# Patient Record
Sex: FEMALE | Race: BLACK OR AFRICAN AMERICAN | NOT HISPANIC OR LATINO | Employment: FULL TIME | URBAN - METROPOLITAN AREA
[De-identification: names, ages, dates, MRNs, and addresses within clinical notes are randomized per-mention and may not be internally consistent; named-entity substitution may affect disease eponyms.]

---

## 2017-01-05 ENCOUNTER — OFFICE VISIT (OUTPATIENT)
Dept: ENDOCRINOLOGY | Age: 62
End: 2017-01-05

## 2017-01-05 VITALS
SYSTOLIC BLOOD PRESSURE: 126 MMHG | WEIGHT: 293 LBS | DIASTOLIC BLOOD PRESSURE: 78 MMHG | HEIGHT: 65 IN | BODY MASS INDEX: 48.82 KG/M2

## 2017-01-05 DIAGNOSIS — IMO0001 UNCONTROLLED TYPE 2 DIABETES MELLITUS WITHOUT COMPLICATION, WITH LONG-TERM CURRENT USE OF INSULIN: ICD-10-CM

## 2017-01-05 DIAGNOSIS — L65.9 HAIR LOSS: ICD-10-CM

## 2017-01-05 DIAGNOSIS — I10 BENIGN HYPERTENSION: ICD-10-CM

## 2017-01-05 DIAGNOSIS — IMO0001 UNCONTROLLED DIABETES MELLITUS TYPE 2 WITHOUT COMPLICATIONS, UNSPECIFIED LONG TERM INSULIN USE STATUS: Primary | ICD-10-CM

## 2017-01-05 DIAGNOSIS — E66.01 MORBID OBESITY DUE TO EXCESS CALORIES (HCC): ICD-10-CM

## 2017-01-05 DIAGNOSIS — E11.65 UNCONTROLLED TYPE 2 DIABETES MELLITUS WITH HYPERGLYCEMIA, UNSPECIFIED LONG TERM INSULIN USE STATUS: ICD-10-CM

## 2017-01-05 DIAGNOSIS — N28.9 RENAL INSUFFICIENCY: ICD-10-CM

## 2017-01-05 DIAGNOSIS — E78.5 DYSLIPIDEMIA: ICD-10-CM

## 2017-01-05 PROCEDURE — 99214 OFFICE O/P EST MOD 30 MIN: CPT | Performed by: NURSE PRACTITIONER

## 2017-01-05 RX ORDER — INSULIN ASPART 100 [IU]/ML
20 INJECTION, SOLUTION INTRAVENOUS; SUBCUTANEOUS
Qty: 30 ML | Refills: 3 | Status: SHIPPED | OUTPATIENT
Start: 2017-01-05 | End: 2017-01-09 | Stop reason: SDUPTHER

## 2017-01-05 RX ORDER — INSULIN DETEMIR 100 [IU]/ML
80 INJECTION, SOLUTION SUBCUTANEOUS 2 TIMES DAILY
Qty: 45 ML | Refills: 3 | Status: SHIPPED | OUTPATIENT
Start: 2017-01-05 | End: 2017-05-02 | Stop reason: SDUPTHER

## 2017-01-05 RX ORDER — LOSARTAN POTASSIUM 25 MG/1
25 TABLET ORAL DAILY
COMMUNITY
Start: 2016-10-23 | End: 2017-04-27 | Stop reason: SDUPTHER

## 2017-01-05 NOTE — PROGRESS NOTES
Subjective   Ria Chan is a 61 y.o. female is here today for follow-up.    Diabetes   She has type 2 diabetes mellitus. No MedicAlert identification noted. The initial diagnosis of diabetes was made 16 years ago. Pertinent negatives for hypoglycemia include no confusion, dizziness, headaches, hunger, mood changes, nervousness/anxiousness, pallor, seizures, sleepiness, speech difficulty, sweats or tremors. Associated symptoms include blurred vision, fatigue, polydipsia, visual change and weakness. Pertinent negatives for diabetes include no chest pain, no foot paresthesias, no foot ulcerations, no polyphagia and no weight loss. Pertinent negatives for hypoglycemia complications include no blackouts, no hospitalization, no required assistance and no required glucagon injection. Symptoms are worsening. Pertinent negatives for diabetic complications include no CVA, heart disease, impotence, nephropathy, peripheral neuropathy, PVD or retinopathy. Risk factors for coronary artery disease include dyslipidemia, family history, hypertension, obesity, post-menopausal and stress. Current diabetic treatment includes insulin injections. She is compliant with treatment most of the time. She is currently taking insulin pre-breakfast, pre-lunch and pre-dinner. Insulin injections are given by patient. Rotation sites for injection include the abdominal wall and arms. Her weight is fluctuating dramatically. She is following a generally healthy diet. Meal planning includes carbohydrate counting. She has not had a previous visit with a dietitian. She participates in exercise weekly. She monitors blood glucose at home 1-2 x per day. Blood glucose monitoring compliance is excellent. Her home blood glucose trend is increasing rapidly. Her breakfast blood glucose is taken between 5-6 am. Her breakfast blood glucose range is generally 180-200 mg/dl. Her dinner blood glucose is taken between 6-7 pm. Her dinner blood glucose range is  generally 180-200 mg/dl. Her highest blood glucose is 140-180 mg/dl. Her overall blood glucose range is 180-200 mg/dl. She does not see a podiatrist.Eye exam is current.     Encounter Diagnoses   Name Primary?   • Uncontrolled diabetes mellitus type 2 without complications, unspecified long term insulin use status Yes   • Dyslipidemia    • Benign hypertension    • Morbid obesity due to excess calories    • Renal insufficiency    • Uncontrolled type 2 diabetes mellitus with hyperglycemia, unspecified long term insulin use status    • Uncontrolled type 2 diabetes mellitus without complication, with long-term current use of insulin    • Hair loss        This is a 61-year-old female patient who is here today for a routine follow-up visit of the above-mentioned problems.  She is wanting to come off her medications because she is concerned about kidney damage.  She also refuses to take a GL P-1 test sgLT-2 because she is concerned of the side effects and states there are lots of side effects to some of the medications.  She was taken off her metformin due to signs of kidney distress.  She has been googling holistic approaches which she feels would be a better option for her.  I had a very long discussion with her regarding the lack of research and studies to support non-FDA approved therapies.  She has not had any recent labs done.  I reviewed her previous labs with her which reflects that her diabetes has never been under control since being on this office.  She states she is watching her diet however when questioned more closely she states she is probably not doing as much as she needs to do to help get some of her weight off.  Lots of stress at work. Working out with wt lifting and riding bike about 3 times a week. Has gained weight. States saxenda did not work to help lose weight. She is not very receptive to adding other medications to her current regimen however she is agreeable to increase her insulin.  She states  is too difficult for her to call during work hours to report her blood sugars and I have reminded the patient that she can email us through my chart.  Her lowest blood sugar was in the 60 range with symptoms several months ago.  She did bring her blood glucose meter which reflects that she is checking her blood sugars one to 3 times daily with all readings from the download be an upper 100-200+ range   The following portions of the patient's history were reviewed and updated as appropriate: allergies, current medications, past family history, past medical history, past social history, past surgical history and problem list.    Review of Systems   Constitutional: Positive for fatigue. Negative for weight loss.   HENT: Negative for ear pain.    Eyes: Positive for blurred vision. Negative for redness.   Respiratory: Negative for cough.    Cardiovascular: Negative for chest pain.   Gastrointestinal: Negative for blood in stool.   Endocrine: Positive for polydipsia. Negative for polyphagia.   Genitourinary: Negative for flank pain and impotence.   Musculoskeletal: Negative for myalgias.   Skin: Negative for pallor.   Allergic/Immunologic: Negative for food allergies.   Neurological: Positive for weakness. Negative for dizziness, tremors, seizures, speech difficulty and headaches.   Hematological: Negative for adenopathy.   Psychiatric/Behavioral: Negative for confusion. The patient is not nervous/anxious.        Objective   Physical Exam   Constitutional: She is oriented to person, place, and time. She appears well-developed and well-nourished. No distress.   HENT:   Head: Normocephalic and atraumatic.   Right Ear: External ear normal.   Left Ear: External ear normal.   Nose: Nose normal.   Mouth/Throat: Oropharynx is clear and moist. No oropharyngeal exudate.   Eyes: EOM are normal. Pupils are equal, round, and reactive to light. Right eye exhibits no discharge. Left eye exhibits no discharge.   Neck: Trachea normal,  normal range of motion and full passive range of motion without pain. Neck supple. No tracheal tenderness present. Carotid bruit is not present. No tracheal deviation, no edema and no erythema present. No thyroid mass and no thyromegaly present.   Cardiovascular: Normal rate, regular rhythm, normal heart sounds and intact distal pulses.  Exam reveals no gallop and no friction rub.    No murmur heard.  Pulmonary/Chest: Effort normal and breath sounds normal. No stridor. No respiratory distress. She has no wheezes. She has no rales.   Abdominal: Soft. Bowel sounds are normal. She exhibits no distension.   Musculoskeletal: Normal range of motion. She exhibits no edema or deformity.   Lymphadenopathy:     She has no cervical adenopathy.   Neurological: She is alert and oriented to person, place, and time.   Skin: Skin is warm and dry. No rash noted. She is not diaphoretic. No erythema. No pallor.   Acanthosis nigricans   Psychiatric: She has a normal mood and affect. Her behavior is normal. Judgment and thought content normal.   Nursing note and vitals reviewed.      Results for orders placed or performed in visit on 08/12/16   Comprehensive metabolic panel   Result Value Ref Range    Glucose 150 (H) 65 - 99 mg/dL    BUN 21 (H) 6 - 20 mg/dL    Creatinine 1.22 (H) 0.57 - 1.00 mg/dL    eGFR Non African Am 45 (L) >60 mL/min/1.73    eGFR African Am 55 (L) >60 mL/min/1.73    BUN/Creatinine Ratio 17.2 7.0 - 25.0    Sodium 142 136 - 145 mmol/L    Potassium 3.7 3.5 - 5.2 mmol/L    Chloride 100 98 - 107 mmol/L    Total CO2 26.0 22.0 - 29.0 mmol/L    Calcium 10.0 8.6 - 10.5 mg/dL    Total Protein 7.6 6.0 - 8.5 g/dL    Albumin 4.50 3.50 - 5.20 g/dL    Globulin 3.1 gm/dL    A/G Ratio 1.5 g/dL    Total Bilirubin 0.2 0.1 - 1.2 mg/dL    Alkaline Phosphatase 89 39 - 117 U/L    AST (SGOT) 19 1 - 32 U/L    ALT (SGPT) 21 1 - 33 U/L   C-peptide   Result Value Ref Range    C-Peptide 4.2 1.1 - 4.4 ng/mL   Lipid panel   Result Value Ref Range     Total Cholesterol 154 0 - 200 mg/dL    Triglycerides 69 0 - 150 mg/dL    HDL Cholesterol 69 (H) 40 - 60 mg/dL    VLDL Cholesterol 13.8 5 - 40 mg/dL    LDL Cholesterol  71 0 - 100 mg/dL   Hemoglobin A1c   Result Value Ref Range    Hemoglobin A1C 7.78 (H) 4.80 - 5.60 %   T4 and TSH ([facility] Only)   Result Value Ref Range    TSH 2.020 0.270 - 4.200 mIU/mL    T4, Total 6.38 4.50 - 11.70 mcg/dL   Uric acid   Result Value Ref Range    Uric Acid 6.9 (H) 2.4 - 5.7 mg/dL   Vitamin D 25 hydroxy   Result Value Ref Range    25 Hydroxy, Vitamin D 42.9 30.0 - 100.0 ng/mL   MicroAlbumin, urine, random   Result Value Ref Range    Microalbumin, Urine 206.8 Not Estab. ug/mL       Assessment/Plan   Ria was seen today for diabetes and hypertension.    Diagnoses and all orders for this visit:    Uncontrolled diabetes mellitus type 2 without complications, unspecified long term insulin use status    Dyslipidemia    Benign hypertension    Morbid obesity due to excess calories    In summary, patient was seen and examined.  I spent an extensive amount of time with her discussing the pathophysiology of type 2 diabetes and the methods of treatment.  She was also counseled on the actions of medications.  Her last labs reflected that she had some renal insufficiency however prior to that her kidney function was in the normal range.  She is more concerned with taking medications that she is about the risk of complications with type 2 diabetes.  She was reminded at today's visit that she can email us through my chart to let us know what her blood sugars are doing so that we can make further adjustments.  She was also reminded on treatment of hypoglycemic reactions and warned that with the increase of insulin we are also increasing her risk of hypoglycemia.  She has been instructed to monitor her blood sugars closely.  She will follow-up with Dr. Carrillo her next appointment with labs prior.  She will have extensive labs done today and she  will be notified of the results along with further recommendations if needed.    Increase levemir to 80 units twice daily  Increase novolog 20 units each meal  If blood sugars do not come down please contact office.   Treat any bs less than 70 with 4 glucose tabs.

## 2017-01-05 NOTE — MR AVS SNAPSHOT
Ria Chan   1/5/2017 3:00 PM   Office Visit    Dept Phone:  [phone]   Encounter #:  [id number]    Provider:  RASHAWN Harrington   Department:  [facility] ENDOCRINOLOGY                Your Full Care Plan              Today's Medication Changes          These changes are accurate as of: 1/5/17  4:14 PM.  If you have any questions, ask your nurse or doctor.               Medication(s)that have changed:     LEVEMIR FLEXPEN 100 UNIT/ML injection   Generic drug:  insulin detemir   Inject 80 Units under the skin 2 (Two) Times a Day.   What changed:  how much to take   Changed by:  RASHAWN Harrington       losartan 25 MG tablet   Commonly known as:  COZAAR   Take 25 mg by mouth Daily.   What changed:  Another medication with the same name was removed. Continue taking this medication, and follow the directions you see here.   Changed by:  RASHAWN Harrington       NOVOLOG FLEXPEN 100 UNIT/ML solution pen-injector sc pen   Generic drug:  insulin aspart   Inject 20 Units under the skin 3 (Three) Times a Day With Meals. 10 units ac bf, 5 ac lunch, and 35 ac dinner   What changed:  how much to take   Changed by:  RASHAWN Harrington       rosuvastatin 20 MG tablet   Commonly known as:  CRESTOR   Take 20 mg by mouth Daily.   What changed:  Another medication with the same name was removed. Continue taking this medication, and follow the directions you see here.   Changed by:  RASHAWN Harrington         Stop taking medication(s)listed here:     SAXENDA 18 MG/3ML solution pen-injector   Generic drug:  Liraglutide -Weight Management   Stopped by:  RASHAWN Harrington                Where to Get Your Medications      These medications were sent to [facility] - [place], [place] - [address] [place] [address] - [phone]  - [phone] [address] [address]     Phone:  [phone]     LEVEMIR FLEXPEN 100 UNIT/ML injection    NOVOLOG FLEXPEN  100 UNIT/ML solution pen-injector sc pen                  Your Updated Medication List          This list is accurate as of: 1/5/17  4:14 PM.  Always use your most recent med list.                allopurinol 100 MG tablet   Commonly known as:  ZYLOPRIM   Take 1 tablet by mouth daily.       amLODIPine 10 MG tablet   Commonly known as:  NORVASC   Take 1 tablet by mouth Daily.       aspirin 81 MG tablet       BD PEN NEEDLE SERVANDO U/F 32G X 4 MM misc   Generic drug:  Insulin Pen Needle       furosemide 40 MG tablet   Commonly known as:  LASIX       KLOR-CON M15 PO       LEVEMIR FLEXPEN 100 UNIT/ML injection   Generic drug:  insulin detemir   Inject 80 Units under the skin 2 (Two) Times a Day.       losartan 25 MG tablet   Commonly known as:  COZAAR       MULTIVITAMIN ADULT PO       NOVOLOG FLEXPEN 100 UNIT/ML solution pen-injector sc pen   Generic drug:  insulin aspart   Inject 20 Units under the skin 3 (Three) Times a Day With Meals. 10 units ac bf, 5 ac lunch, and 35 ac dinner       ONE TOUCH ULTRA TEST test strip   Generic drug:  glucose blood       rosuvastatin 20 MG tablet   Commonly known as:  CRESTOR               We Performed the Following     C-Peptide     Comprehensive Metabolic Panel     Hemoglobin A1c     Lipid Panel     MicroAlbumin, Urine, Random     T3, Free     T4, Free     TestT+TestF+SHBG     Thyroid Panel With TSH     Uric Acid     Vitamin D 25 Hydroxy       You Were Diagnosed With        Codes Comments    Uncontrolled diabetes mellitus type 2 without complications, unspecified long term insulin use status    -  Primary ICD-10-CM: E11.65  ICD-9-CM: 250.02     Dyslipidemia     ICD-10-CM: E78.5  ICD-9-CM: 272.4     Benign hypertension     ICD-10-CM: I10  ICD-9-CM: 401.1     Morbid obesity due to excess calories     ICD-10-CM: E66.01  ICD-9-CM: 278.01     Renal insufficiency     ICD-10-CM: N28.9  ICD-9-CM: 593.9     Uncontrolled type 2 diabetes mellitus with hyperglycemia, unspecified long term insulin use  status     ICD-10-CM: E11.65  ICD-9-CM: 250.02     Uncontrolled type 2 diabetes mellitus without complication, with long-term current use of insulin     ICD-10-CM: E11.65, Z79.4  ICD-9-CM: 250.02, V58.67     Hair loss     ICD-10-CM: L65.9  ICD-9-CM: 704.00       Instructions    Increase levemir to 80 units twice daily  Increase novolog 20 units each meal  If blood sugars do not come down please contact office.   Treat any bs less than 70 with 4 glucose tabs.      Patient Instructions History      Upcoming Appointments     Visit Type Date Time Department    OFFICE VISIT 1/5/2017  3:00 PM [facility]    OFFICE VISIT 1/6/2017 10:00 AM [facility]    [facility] 4/28/2017  4:00 PM [facility]    OFFICE VISIT 5/12/2017  3:00 PM [facility]      MyChart Signup     Our records indicate that you have an active [employer] account.    You can view your After Visit Summary by going to [employer] and logging in with your [employer] username and password.  If you don't have a [employer] username and password but a parent or guardian has access to your record, the parent or guardian should login with their own [employer] username and password and access your record to view the After Visit Summary.    If you have questions, you can email [email or web address] or call [phone] to talk to our [employer] staff.  Remember, [employer] is NOT to be used for urgent needs.  For medical emergencies, dial 911.               Other Info from Your Visit           Your Appointments     Jan 06, 2017 10:00 AM EST   Office Visit with Talisha Weems MD   [facility] FAMILY MEDICINE (--)    [address] [facility]  [facility] [address] [phone]           Arrive 15 minutes prior to appointment.            Apr 28, 2017  4:00 PM EDT   [facility], [facility]   [facility] ENDOCR[facility]OLOGY (--)    [address] [place] [address]  "[id number] [phone]            May 12, 2017  3:00 PM EDT   Office Visit with Jesus Carrillo MD   [facility] ENDOCRINOLOGY (--)    [address] [address] [phone]           Arrive 15 minutes prior to appointment.              Allergies     Aliskiren Allergy Other (See Comments)    Causes patient to go into a coma    Ace Inhibitors Allergy Angioedema    Diclofenac Allergy Angioedema    Sulfamethoxazole-trimethoprim Allergy Nausea Only      Reason for Visit     Diabetes no recent labs, brought BG meter, testing BG 2 times daily, patient is asking for a holistic approach to treating diabetes    Hypertension patient is not taking Saxenda or allopurinol      Vital Signs     Blood Pressure Height Weight Body Mass Index Smoking Status       126/78 65\" (165.1 cm) 304 lb 6.4 oz (138 kg) 50.65 kg/m2 Never Smoker       Problems and Diagnoses Noted     High blood pressure    Dyslipidemia    Hair loss    Severe obesity    Poor kidney function    Type II diabetes mellitus without control        "

## 2017-01-05 NOTE — PATIENT INSTRUCTIONS
Increase levemir to 80 units twice daily  Increase novolog 20 units each meal  If blood sugars do not come down please contact office.   Treat any bs less than 70 with 4 glucose tabs.

## 2017-01-06 ENCOUNTER — OFFICE VISIT (OUTPATIENT)
Dept: FAMILY MEDICINE CLINIC | Facility: CLINIC | Age: 62
End: 2017-01-06

## 2017-01-06 VITALS
RESPIRATION RATE: 17 BRPM | HEIGHT: 65 IN | WEIGHT: 293 LBS | SYSTOLIC BLOOD PRESSURE: 142 MMHG | BODY MASS INDEX: 48.82 KG/M2 | HEART RATE: 79 BPM | DIASTOLIC BLOOD PRESSURE: 80 MMHG | OXYGEN SATURATION: 98 %

## 2017-01-06 DIAGNOSIS — I10 ESSENTIAL HYPERTENSION: Primary | ICD-10-CM

## 2017-01-06 DIAGNOSIS — Z79.899 DRUG THERAPY: ICD-10-CM

## 2017-01-06 DIAGNOSIS — IMO0001 UNCONTROLLED TYPE 2 DIABETES MELLITUS WITHOUT COMPLICATION, WITH LONG-TERM CURRENT USE OF INSULIN: ICD-10-CM

## 2017-01-06 DIAGNOSIS — E66.01 MORBID OBESITY DUE TO EXCESS CALORIES (HCC): ICD-10-CM

## 2017-01-06 DIAGNOSIS — Z00.00 HEALTH CARE MAINTENANCE: ICD-10-CM

## 2017-01-06 PROCEDURE — 99214 OFFICE O/P EST MOD 30 MIN: CPT | Performed by: FAMILY MEDICINE

## 2017-01-06 RX ORDER — FUROSEMIDE 40 MG/1
40 TABLET ORAL 2 TIMES DAILY
Qty: 90 TABLET | Refills: 3 | Status: SHIPPED | OUTPATIENT
Start: 2017-01-06 | End: 2017-04-27 | Stop reason: SDUPTHER

## 2017-01-06 RX ORDER — FUROSEMIDE 40 MG/1
40 TABLET ORAL 2 TIMES DAILY
Qty: 90 TABLET | Refills: 3 | Status: SHIPPED | OUTPATIENT
Start: 2017-01-06 | End: 2017-01-06 | Stop reason: SDUPTHER

## 2017-01-06 RX ORDER — ROSUVASTATIN CALCIUM 20 MG/1
20 TABLET, COATED ORAL DAILY
Qty: 90 TABLET | Refills: 3 | Status: SHIPPED | OUTPATIENT
Start: 2017-01-06 | End: 2017-07-27 | Stop reason: SDUPTHER

## 2017-01-06 RX ORDER — AMLODIPINE BESYLATE 10 MG/1
10 TABLET ORAL DAILY
Qty: 90 TABLET | Refills: 3 | Status: SHIPPED | OUTPATIENT
Start: 2017-01-06 | End: 2017-07-27 | Stop reason: SDUPTHER

## 2017-01-06 NOTE — MR AVS SNAPSHOT
Ria Chan   1/6/2017 10:00 AM   Office Visit    Dept Phone:  [phone]   Encounter #:  [id number]    Provider:  Talisha Weems MD   Department:  [facility] FAMILY MEDICINE                Your Full Care Plan              Today's Medication Changes          These changes are accurate as of: 1/6/17 10:45 AM.  If you have any questions, ask your nurse or doctor.               Stop taking medication(s)listed here:     allopurinol 100 MG tablet   Commonly known as:  ZYLOPRIM                Where to Get Your Medications      These medications were sent to [facility] - [place], [place] - [address] [place] [address] - [phone]  - [phone]   [address] [place] [address]     Phone:  [phone]     amLODIPine 10 MG tablet    furosemide 40 MG tablet    rosuvastatin 20 MG tablet                  Your Updated Medication List          This list is accurate as of: 1/6/17 10:45 AM.  Always use your most recent med list.                amLODIPine 10 MG tablet   Commonly known as:  NORVASC   Take 1 tablet by mouth Daily.       aspirin 81 MG tablet       BD PEN NEEDLE SERVANDO U/F 32G X 4 MM misc   Generic drug:  Insulin Pen Needle       furosemide 40 MG tablet   Commonly known as:  LASIX   Take 1 tablet by mouth 2 (Two) Times a Day.       KLOR-CON M15 PO       LEVEMIR FLEXPEN 100 UNIT/ML injection   Generic drug:  insulin detemir   Inject 80 Units under the skin 2 (Two) Times a Day.       losartan 25 MG tablet   Commonly known as:  COZAAR       MULTIVITAMIN ADULT PO       NOVOLOG FLEXPEN 100 UNIT/ML solution pen-injector sc pen   Generic drug:  insulin aspart   Inject 20 Units under the skin 3 (Three) Times a Day With Meals. 10 units ac bf, 5 ac lunch, and 35 ac dinner       ONE TOUCH ULTRA TEST test strip   Generic drug:  glucose blood       rosuvastatin 20 MG tablet   Commonly known as:  CRESTOR   Take 1 tablet by mouth Daily.               We  Performed the Following     CBC & Differential     Hepatitis C Antibody       You Were Diagnosed With        Codes Comments    Drug therapy    -  Primary ICD-10-CM: Z79.899  ICD-9-CM: V58.69     Health care maintenance     ICD-10-CM: Z00.00  ICD-9-CM: V70.0     Morbid obesity due to excess calories     ICD-10-CM: E66.01  ICD-9-CM: 278.01     Uncontrolled type 2 diabetes mellitus without complication, with long-term current use of insulin     ICD-10-CM: E11.65, Z79.4  ICD-9-CM: 250.02, V58.67     Essential hypertension     ICD-10-CM: I10  ICD-9-CM: 401.9       Instructions     None    Patient Instructions History      Upcoming Appointments     Visit Type Date Time Department    OFFICE VISIT 1/6/2017 10:00 AM [facility]    [facility] 4/28/2017  4:00 PM [facility]    OFFICE VISIT 5/12/2017  3:00 PM [facility]      [employer] Signup     Our records indicate that you have an active [employer] account.    You can view your After Visit Summary by going to [employer] and logging in with your [employer] username and password.  If you don't have a [employer] username and password but a parent or guardian has access to your record, the parent or guardian should login with their own [employer] username and password and access your record to view the After Visit Summary.    If you have questions, you can email [email or web address] or call [phone] to talk to our [employer] staff.  Remember, [employer] is NOT to be used for urgent needs.  For medical emergencies, dial 911.               Other Info from Your Visit           Your Appointments     Apr 28, 2017  4:00 PM EDT   [facility]   [facility] ENDOCRINOLOGY (--)    [address] [place] [address]   [phone]            May 12, 2017  3:00 PM EDT   Office Visit with Jesus Carrillo MD   [facility] ENDOCRINOLOGY (--)    [address]  "[address] [place] [address] [phone]           Arrive 15 minutes prior to appointment.              Allergies     Aliskiren Allergy Other (See Comments)    Causes patient to go into a coma    Ace Inhibitors Allergy Angioedema    Diclofenac Allergy Angioedema    Sulfamethoxazole-trimethoprim Allergy Nausea Only      Reason for Visit     Diabetes           Vital Signs     Blood Pressure Pulse Respirations Height Weight Oxygen Saturation    142/80 79 17 65\" (165.1 cm) 302 lb (137 kg) 98%    Body Mass Index Smoking Status                50.26 kg/m2 Never Smoker          Problems and Diagnoses Noted     Severe obesity    Type II diabetes mellitus without control    Pharmacologic therapy    -  Primary    Health maintenance examination        High blood pressure            "

## 2017-01-07 LAB
25(OH)D3+25(OH)D2 SERPL-MCNC: 32.9 NG/ML (ref 30–100)
ALBUMIN SERPL-MCNC: 4.5 G/DL (ref 3.5–5.2)
ALBUMIN/GLOB SERPL: 1.7 G/DL
ALP SERPL-CCNC: 122 U/L (ref 39–117)
ALT SERPL-CCNC: 25 U/L (ref 1–33)
AST SERPL-CCNC: 23 U/L (ref 1–32)
BASOPHILS # BLD AUTO: 0.02 10*3/MM3 (ref 0–0.2)
BASOPHILS NFR BLD AUTO: 0.3 % (ref 0–1.5)
BILIRUB SERPL-MCNC: 0.3 MG/DL (ref 0.1–1.2)
BUN SERPL-MCNC: 18 MG/DL (ref 8–23)
BUN/CREAT SERPL: 15.9 (ref 7–25)
C PEPTIDE SERPL-MCNC: 4.2 NG/ML (ref 1.1–4.4)
CALCIUM SERPL-MCNC: 9.6 MG/DL (ref 8.6–10.5)
CHLORIDE SERPL-SCNC: 101 MMOL/L (ref 98–107)
CHOLEST SERPL-MCNC: 145 MG/DL (ref 0–200)
CO2 SERPL-SCNC: 27.3 MMOL/L (ref 22–29)
CREAT SERPL-MCNC: 1.13 MG/DL (ref 0.57–1)
EOSINOPHIL # BLD AUTO: 0.2 10*3/MM3 (ref 0–0.7)
EOSINOPHIL NFR BLD AUTO: 2.8 % (ref 0.3–6.2)
ERYTHROCYTE [DISTWIDTH] IN BLOOD BY AUTOMATED COUNT: 16.3 % (ref 11.7–13)
FT4I SERPL CALC-MCNC: 1.7 (ref 1.2–4.9)
GLOBULIN SER CALC-MCNC: 2.6 GM/DL
GLUCOSE SERPL-MCNC: 266 MG/DL (ref 65–99)
HBA1C MFR BLD: 10.15 % (ref 4.8–5.6)
HCT VFR BLD AUTO: 39 % (ref 35.6–45.5)
HCV AB S/CO SERPL IA: 0.1 S/CO RATIO (ref 0–0.9)
HDLC SERPL-MCNC: 61 MG/DL (ref 40–60)
HGB BLD-MCNC: 12.3 G/DL (ref 11.9–15.5)
IMM GRANULOCYTES # BLD: 0 10*3/MM3 (ref 0–0.03)
IMM GRANULOCYTES NFR BLD: 0 % (ref 0–0.5)
LDLC SERPL CALC-MCNC: 68 MG/DL (ref 0–100)
LYMPHOCYTES # BLD AUTO: 1.67 10*3/MM3 (ref 0.9–4.8)
LYMPHOCYTES NFR BLD AUTO: 23.7 % (ref 19.6–45.3)
MCH RBC QN AUTO: 24.8 PG (ref 26.9–32)
MCHC RBC AUTO-ENTMCNC: 31.5 G/DL (ref 32.4–36.3)
MCV RBC AUTO: 78.8 FL (ref 80.5–98.2)
MICROALBUMIN UR-MCNC: 467.1 UG/ML
MONOCYTES # BLD AUTO: 0.57 10*3/MM3 (ref 0.2–1.2)
MONOCYTES NFR BLD AUTO: 8.1 % (ref 5–12)
NEUTROPHILS # BLD AUTO: 4.6 10*3/MM3 (ref 1.9–8.1)
NEUTROPHILS NFR BLD AUTO: 65.1 % (ref 42.7–76)
NRBC BLD AUTO-RTO: 0 /100 WBC (ref 0–0)
PLATELET # BLD AUTO: 336 10*3/MM3 (ref 140–500)
POTASSIUM SERPL-SCNC: 4.2 MMOL/L (ref 3.5–5.2)
PROT SERPL-MCNC: 7.1 G/DL (ref 6–8.5)
RBC # BLD AUTO: 4.95 10*6/MM3 (ref 3.9–5.2)
SHBG SERPL-SCNC: 22.6 NMOL/L (ref 17.3–125)
SODIUM SERPL-SCNC: 143 MMOL/L (ref 136–145)
T3FREE SERPL-MCNC: 2.7 PG/ML (ref 2–4.4)
T3RU NFR SERPL: 28 % (ref 24–39)
T4 FREE SERPL-MCNC: 0.9 NG/DL (ref 0.93–1.7)
T4 SERPL-MCNC: 6 UG/DL (ref 4.5–12)
TESTOST FREE SERPL-MCNC: 1 PG/ML (ref 0–4.2)
TESTOST SERPL-MCNC: 15 NG/DL (ref 3–41)
TRIGL SERPL-MCNC: 82 MG/DL (ref 0–150)
TSH SERPL DL<=0.005 MIU/L-ACNC: 2.06 UIU/ML (ref 0.45–4.5)
URATE SERPL-MCNC: 6.3 MG/DL (ref 2.4–5.7)
VLDLC SERPL CALC-MCNC: 16.4 MG/DL (ref 5–40)
WBC # BLD AUTO: 7.06 10*3/MM3 (ref 4.5–10.7)

## 2017-01-09 DIAGNOSIS — IMO0001 UNCONTROLLED TYPE 2 DIABETES MELLITUS WITHOUT COMPLICATION, WITH LONG-TERM CURRENT USE OF INSULIN: ICD-10-CM

## 2017-01-09 DIAGNOSIS — IMO0001 UNCONTROLLED DIABETES MELLITUS TYPE 2 WITHOUT COMPLICATIONS, UNSPECIFIED LONG TERM INSULIN USE STATUS: ICD-10-CM

## 2017-01-09 DIAGNOSIS — N28.9 RENAL INSUFFICIENCY: ICD-10-CM

## 2017-01-09 DIAGNOSIS — E78.5 DYSLIPIDEMIA: ICD-10-CM

## 2017-01-09 DIAGNOSIS — I10 BENIGN HYPERTENSION: ICD-10-CM

## 2017-01-09 DIAGNOSIS — E11.65 UNCONTROLLED TYPE 2 DIABETES MELLITUS WITH HYPERGLYCEMIA, UNSPECIFIED LONG TERM INSULIN USE STATUS: ICD-10-CM

## 2017-01-09 DIAGNOSIS — E66.01 MORBID OBESITY DUE TO EXCESS CALORIES (HCC): ICD-10-CM

## 2017-01-09 RX ORDER — INSULIN ASPART 100 [IU]/ML
INJECTION, SOLUTION INTRAVENOUS; SUBCUTANEOUS
Qty: 30 ML | Refills: 3 | Status: SHIPPED | OUTPATIENT
Start: 2017-01-09 | End: 2017-05-02 | Stop reason: SDUPTHER

## 2017-01-11 DIAGNOSIS — IMO0001 UNCONTROLLED DIABETES MELLITUS TYPE 2 WITHOUT COMPLICATIONS, UNSPECIFIED LONG TERM INSULIN USE STATUS: ICD-10-CM

## 2017-01-11 DIAGNOSIS — E66.01 MORBID OBESITY DUE TO EXCESS CALORIES (HCC): ICD-10-CM

## 2017-01-11 DIAGNOSIS — N28.9 RENAL INSUFFICIENCY: ICD-10-CM

## 2017-01-11 DIAGNOSIS — E11.65 UNCONTROLLED TYPE 2 DIABETES MELLITUS WITH HYPERGLYCEMIA, UNSPECIFIED LONG TERM INSULIN USE STATUS: ICD-10-CM

## 2017-01-11 DIAGNOSIS — I10 BENIGN HYPERTENSION: ICD-10-CM

## 2017-01-11 DIAGNOSIS — IMO0001 UNCONTROLLED TYPE 2 DIABETES MELLITUS WITHOUT COMPLICATION, WITH LONG-TERM CURRENT USE OF INSULIN: ICD-10-CM

## 2017-01-11 DIAGNOSIS — E78.5 DYSLIPIDEMIA: ICD-10-CM

## 2017-02-28 DIAGNOSIS — E66.01 MORBID OBESITY DUE TO EXCESS CALORIES (HCC): ICD-10-CM

## 2017-02-28 DIAGNOSIS — E11.00 UNCONTROLLED TYPE 2 DIABETES MELLITUS WITH HYPEROSMOLARITY WITHOUT COMA, WITHOUT LONG-TERM CURRENT USE OF INSULIN (HCC): ICD-10-CM

## 2017-02-28 RX ORDER — POTASSIUM CHLORIDE 1125 MG/1
15 TABLET, EXTENDED RELEASE ORAL 2 TIMES DAILY
Qty: 180 TABLET | Refills: 1 | Status: SHIPPED | OUTPATIENT
Start: 2017-02-28 | End: 2017-06-15

## 2017-04-21 DIAGNOSIS — E55.9 VITAMIN D DEFICIENCY: Primary | ICD-10-CM

## 2017-04-21 DIAGNOSIS — E78.5 DYSLIPIDEMIA: ICD-10-CM

## 2017-04-21 DIAGNOSIS — IMO0001 UNCONTROLLED DIABETES MELLITUS TYPE 2 WITHOUT COMPLICATIONS, UNSPECIFIED LONG TERM INSULIN USE STATUS: Primary | ICD-10-CM

## 2017-04-27 DIAGNOSIS — IMO0001 UNCONTROLLED TYPE 2 DIABETES MELLITUS WITHOUT COMPLICATION, WITH LONG-TERM CURRENT USE OF INSULIN: ICD-10-CM

## 2017-04-27 DIAGNOSIS — I10 ESSENTIAL HYPERTENSION: ICD-10-CM

## 2017-04-27 DIAGNOSIS — E66.01 MORBID OBESITY DUE TO EXCESS CALORIES (HCC): ICD-10-CM

## 2017-04-27 DIAGNOSIS — N28.9 RENAL INSUFFICIENCY: ICD-10-CM

## 2017-04-27 DIAGNOSIS — E11.65 UNCONTROLLED TYPE 2 DIABETES MELLITUS WITH HYPERGLYCEMIA, UNSPECIFIED LONG TERM INSULIN USE STATUS: ICD-10-CM

## 2017-04-27 DIAGNOSIS — IMO0001 UNCONTROLLED DIABETES MELLITUS TYPE 2 WITHOUT COMPLICATIONS, UNSPECIFIED LONG TERM INSULIN USE STATUS: ICD-10-CM

## 2017-04-27 DIAGNOSIS — E78.5 DYSLIPIDEMIA: ICD-10-CM

## 2017-04-27 DIAGNOSIS — I10 BENIGN HYPERTENSION: ICD-10-CM

## 2017-04-27 RX ORDER — LOSARTAN POTASSIUM 25 MG/1
25 TABLET ORAL DAILY
Qty: 90 TABLET | Refills: 4 | Status: SHIPPED | OUTPATIENT
Start: 2017-04-27 | End: 2018-12-10

## 2017-04-27 RX ORDER — FUROSEMIDE 40 MG/1
40 TABLET ORAL 2 TIMES DAILY
Qty: 180 TABLET | Refills: 3 | Status: SHIPPED | OUTPATIENT
Start: 2017-04-27 | End: 2017-06-15

## 2017-05-02 DIAGNOSIS — IMO0001 UNCONTROLLED DIABETES MELLITUS TYPE 2 WITHOUT COMPLICATIONS, UNSPECIFIED LONG TERM INSULIN USE STATUS: ICD-10-CM

## 2017-05-02 DIAGNOSIS — N28.9 RENAL INSUFFICIENCY: ICD-10-CM

## 2017-05-02 DIAGNOSIS — E66.01 MORBID OBESITY DUE TO EXCESS CALORIES (HCC): ICD-10-CM

## 2017-05-02 DIAGNOSIS — E78.5 DYSLIPIDEMIA: ICD-10-CM

## 2017-05-02 DIAGNOSIS — I10 BENIGN HYPERTENSION: ICD-10-CM

## 2017-05-02 DIAGNOSIS — E11.65 UNCONTROLLED TYPE 2 DIABETES MELLITUS WITH HYPERGLYCEMIA, UNSPECIFIED LONG TERM INSULIN USE STATUS: ICD-10-CM

## 2017-05-02 DIAGNOSIS — IMO0001 UNCONTROLLED TYPE 2 DIABETES MELLITUS WITHOUT COMPLICATION, WITH LONG-TERM CURRENT USE OF INSULIN: ICD-10-CM

## 2017-05-02 RX ORDER — INSULIN ASPART 100 [IU]/ML
INJECTION, SOLUTION INTRAVENOUS; SUBCUTANEOUS
Qty: 5 PEN | Refills: 1 | Status: SHIPPED | OUTPATIENT
Start: 2017-05-02 | End: 2017-05-05 | Stop reason: SDUPTHER

## 2017-05-02 RX ORDER — INSULIN DETEMIR 100 [IU]/ML
80 INJECTION, SOLUTION SUBCUTANEOUS 2 TIMES DAILY
Qty: 16 PEN | Refills: 1 | Status: SHIPPED | OUTPATIENT
Start: 2017-05-02 | End: 2017-07-27

## 2017-05-05 DIAGNOSIS — E66.01 MORBID OBESITY DUE TO EXCESS CALORIES (HCC): ICD-10-CM

## 2017-05-05 DIAGNOSIS — N28.9 RENAL INSUFFICIENCY: ICD-10-CM

## 2017-05-05 DIAGNOSIS — E78.5 DYSLIPIDEMIA: ICD-10-CM

## 2017-05-05 DIAGNOSIS — I10 BENIGN HYPERTENSION: ICD-10-CM

## 2017-05-05 DIAGNOSIS — E11.65 UNCONTROLLED TYPE 2 DIABETES MELLITUS WITH HYPERGLYCEMIA, UNSPECIFIED LONG TERM INSULIN USE STATUS: ICD-10-CM

## 2017-05-05 DIAGNOSIS — IMO0001 UNCONTROLLED TYPE 2 DIABETES MELLITUS WITHOUT COMPLICATION, WITH LONG-TERM CURRENT USE OF INSULIN: ICD-10-CM

## 2017-05-05 DIAGNOSIS — IMO0001 UNCONTROLLED DIABETES MELLITUS TYPE 2 WITHOUT COMPLICATIONS, UNSPECIFIED LONG TERM INSULIN USE STATUS: ICD-10-CM

## 2017-05-05 RX ORDER — INSULIN ASPART 100 [IU]/ML
INJECTION, SOLUTION INTRAVENOUS; SUBCUTANEOUS
Qty: 6 PEN | Refills: 1 | Status: SHIPPED | OUTPATIENT
Start: 2017-05-05 | End: 2017-07-27 | Stop reason: ALTCHOICE

## 2017-05-20 ENCOUNTER — RESULTS ENCOUNTER (OUTPATIENT)
Dept: ENDOCRINOLOGY | Age: 62
End: 2017-05-20

## 2017-05-20 DIAGNOSIS — E55.9 VITAMIN D DEFICIENCY: ICD-10-CM

## 2017-05-21 ENCOUNTER — RESULTS ENCOUNTER (OUTPATIENT)
Dept: ENDOCRINOLOGY | Age: 62
End: 2017-05-21

## 2017-05-21 DIAGNOSIS — IMO0001 UNCONTROLLED DIABETES MELLITUS TYPE 2 WITHOUT COMPLICATIONS, UNSPECIFIED LONG TERM INSULIN USE STATUS: ICD-10-CM

## 2017-05-21 DIAGNOSIS — E78.5 DYSLIPIDEMIA: ICD-10-CM

## 2017-06-15 ENCOUNTER — OFFICE VISIT (OUTPATIENT)
Dept: FAMILY MEDICINE CLINIC | Facility: CLINIC | Age: 62
End: 2017-06-15

## 2017-06-15 VITALS
OXYGEN SATURATION: 97 % | HEIGHT: 65 IN | WEIGHT: 293 LBS | SYSTOLIC BLOOD PRESSURE: 134 MMHG | DIASTOLIC BLOOD PRESSURE: 80 MMHG | HEART RATE: 72 BPM | TEMPERATURE: 98.7 F | BODY MASS INDEX: 48.82 KG/M2

## 2017-06-15 DIAGNOSIS — I10 BENIGN HYPERTENSION: ICD-10-CM

## 2017-06-15 DIAGNOSIS — E11.21 DIABETIC NEPHROPATHY ASSOCIATED WITH TYPE 2 DIABETES MELLITUS (HCC): Primary | ICD-10-CM

## 2017-06-15 DIAGNOSIS — R60.9 EDEMA, UNSPECIFIED TYPE: ICD-10-CM

## 2017-06-15 DIAGNOSIS — R53.83 FATIGUE, UNSPECIFIED TYPE: ICD-10-CM

## 2017-06-15 DIAGNOSIS — R60.0 LOCALIZED EDEMA: ICD-10-CM

## 2017-06-15 DIAGNOSIS — Z79.899 DRUG THERAPY: ICD-10-CM

## 2017-06-15 PROBLEM — N28.9 RENAL INSUFFICIENCY: Status: RESOLVED | Noted: 2017-01-05 | Resolved: 2017-06-15

## 2017-06-15 LAB
BILIRUB BLD-MCNC: NEGATIVE MG/DL
CLARITY, POC: CLEAR
COLOR UR: YELLOW
GLUCOSE UR STRIP-MCNC: ABNORMAL MG/DL
KETONES UR QL: NEGATIVE
LEUKOCYTE EST, POC: NEGATIVE
NITRITE UR-MCNC: NEGATIVE MG/ML
PH UR: 5.5 [PH] (ref 5–8)
PROT UR STRIP-MCNC: ABNORMAL MG/DL
RBC # UR STRIP: ABNORMAL /UL
SP GR UR: 1.03 (ref 1–1.03)
UROBILINOGEN UR QL: NORMAL

## 2017-06-15 PROCEDURE — 81003 URINALYSIS AUTO W/O SCOPE: CPT | Performed by: FAMILY MEDICINE

## 2017-06-15 PROCEDURE — 99213 OFFICE O/P EST LOW 20 MIN: CPT | Performed by: FAMILY MEDICINE

## 2017-06-15 RX ORDER — TRIAMTERENE AND HYDROCHLOROTHIAZIDE 37.5; 25 MG/1; MG/1
1 TABLET ORAL DAILY
Qty: 90 TABLET | Refills: 3 | Status: SHIPPED | OUTPATIENT
Start: 2017-06-15 | End: 2017-06-20 | Stop reason: SDUPTHER

## 2017-06-15 NOTE — PROGRESS NOTES
"Subjective   Ria Chan is a 61 y.o. female.     History of Present Illness No MAYER or chest pain.  Legs started to swell about a month ago. No recent travel. IN the am they are really swollen as well. Seems like legs are more swollen in the morning than in the evening when she sits with her legs up.   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  Is taking 40 mg lasix every am.    Knows sugars not controlled - 150 fasting to 202 highest fasting.  Next visit with Frances Ball July 17 and lab work from her is in the system to be done.  The following portions of the patient's history were reviewed and updated as appropriate: allergies, current medications, past social history and problem list.    Review of Systems   Constitutional: Positive for fatigue. Negative for activity change, appetite change and unexpected weight change.   HENT: Negative for nosebleeds and trouble swallowing.    Eyes: Negative for pain and visual disturbance.   Respiratory: Positive for shortness of breath. Negative for chest tightness and wheezing.    Cardiovascular: Positive for leg swelling. Negative for chest pain and palpitations.   Gastrointestinal: Negative for abdominal pain and blood in stool.   Endocrine: Negative.    Genitourinary: Negative for difficulty urinating and hematuria.   Musculoskeletal: Positive for arthralgias (feet hurt, as well as low back) and back pain. Negative for joint swelling.   Skin: Negative for color change and rash.   Allergic/Immunologic: Negative.    Neurological: Negative for syncope and speech difficulty.   Hematological: Negative for adenopathy.   Psychiatric/Behavioral: Negative for agitation and confusion. The patient is nervous/anxious.    All other systems reviewed and are negative.      Objective   /80 (BP Location: Right arm, Patient Position: Sitting, Cuff Size: Large Adult)  Pulse 72  Temp 98.7 °F (37.1 °C) (Oral)   Ht 65\" (165.1 cm)  Wt (!) 307 lb 3.2 oz (139 kg)  SpO2 97%  BMI 51.12 " kg/m2  Physical Exam   Constitutional: She is oriented to person, place, and time. Vital signs are normal. She appears well-developed and well-nourished. No distress.   HENT:   Head: Normocephalic.   Neck: Carotid bruit is not present. No thyromegaly present.   Cardiovascular: Normal rate, regular rhythm and normal heart sounds.    Pulmonary/Chest: Effort normal and breath sounds normal.   Musculoskeletal: Edema: 2 bilat leg edema below knees.   Neurological: She is alert and oriented to person, place, and time. Gait normal.   Psychiatric: She has a normal mood and affect. Her behavior is normal. Judgment and thought content normal.   Vitals reviewed.      Assessment/Plan   Problem List Items Addressed This Visit        Cardiovascular and Mediastinum    Benign hypertension    Relevant Medications    triamterene-hydrochlorothiazide (MAXZIDE-25) 37.5-25 MG per tablet    Other Relevant Orders    POC Urinalysis Dipstick, Automated (Completed)       Genitourinary    Diabetic nephropathy associated with type 2 diabetes mellitus - Primary    Relevant Medications    triamterene-hydrochlorothiazide (MAXZIDE-25) 37.5-25 MG per tablet       Other    Localized edema      Other Visit Diagnoses     Drug therapy        Relevant Orders    Basic Metabolic Panel (Completed)    Fatigue, unspecified type        Relevant Orders    TSH (Completed)    Edema, unspecified type        Relevant Medications    triamterene-hydrochlorothiazide (MAXZIDE-25) 37.5-25 MG per tablet           Use the support hose. Or get new ones from drug store.  Athletic shoes, wide- no more flip flops, in order to decrease foot pain.  Break amlodipine in half to take only 5 mg. If that does not resolve the issue may need to substitute another ca channel blocker.    Have just discontinued furosemide and K suppplement. Started Maxzide.

## 2017-06-16 ENCOUNTER — TELEPHONE (OUTPATIENT)
Dept: FAMILY MEDICINE CLINIC | Facility: CLINIC | Age: 62
End: 2017-06-16

## 2017-06-16 PROBLEM — R60.0 LOCALIZED EDEMA: Status: ACTIVE | Noted: 2017-06-16

## 2017-06-16 LAB
BUN SERPL-MCNC: 23 MG/DL (ref 8–23)
BUN/CREAT SERPL: 23 (ref 7–25)
CALCIUM SERPL-MCNC: 9.8 MG/DL (ref 8.6–10.5)
CHLORIDE SERPL-SCNC: 98 MMOL/L (ref 98–107)
CO2 SERPL-SCNC: 25.4 MMOL/L (ref 22–29)
CREAT SERPL-MCNC: 1 MG/DL (ref 0.57–1)
GLUCOSE SERPL-MCNC: 269 MG/DL (ref 65–99)
POTASSIUM SERPL-SCNC: 4.3 MMOL/L (ref 3.5–5.2)
SODIUM SERPL-SCNC: 139 MMOL/L (ref 136–145)
TSH SERPL DL<=0.005 MIU/L-ACNC: 2.12 MIU/ML (ref 0.27–4.2)

## 2017-06-20 DIAGNOSIS — R60.9 EDEMA, UNSPECIFIED TYPE: ICD-10-CM

## 2017-06-20 DIAGNOSIS — I10 BENIGN HYPERTENSION: ICD-10-CM

## 2017-06-20 RX ORDER — TRIAMTERENE AND HYDROCHLOROTHIAZIDE 37.5; 25 MG/1; MG/1
1 TABLET ORAL DAILY
Qty: 10 TABLET | Refills: 0 | Status: SHIPPED | OUTPATIENT
Start: 2017-06-20 | End: 2017-06-20 | Stop reason: SDUPTHER

## 2017-06-20 RX ORDER — TRIAMTERENE AND HYDROCHLOROTHIAZIDE 37.5; 25 MG/1; MG/1
1 TABLET ORAL DAILY
Qty: 90 TABLET | Refills: 3 | Status: SHIPPED | OUTPATIENT
Start: 2017-06-20 | End: 2018-06-15 | Stop reason: SDUPTHER

## 2017-06-22 ENCOUNTER — OFFICE VISIT (OUTPATIENT)
Dept: FAMILY MEDICINE CLINIC | Facility: CLINIC | Age: 62
End: 2017-06-22

## 2017-06-22 VITALS
RESPIRATION RATE: 17 BRPM | OXYGEN SATURATION: 96 % | SYSTOLIC BLOOD PRESSURE: 130 MMHG | HEIGHT: 65 IN | WEIGHT: 293 LBS | HEART RATE: 83 BPM | DIASTOLIC BLOOD PRESSURE: 80 MMHG | BODY MASS INDEX: 48.82 KG/M2

## 2017-06-22 DIAGNOSIS — R60.0 LOCALIZED EDEMA: ICD-10-CM

## 2017-06-22 DIAGNOSIS — I10 BENIGN HYPERTENSION: Primary | ICD-10-CM

## 2017-06-22 PROCEDURE — 99213 OFFICE O/P EST LOW 20 MIN: CPT | Performed by: FAMILY MEDICINE

## 2017-06-22 NOTE — PROGRESS NOTES
"Subjective   Ria Chan is a 61 y.o. female.     History of Present Illness Had changed medicines and improvement of the swelling in legs. Bought support hose at [employer]. 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 Walking is painful. Does admit that after a few imn on her feet, the feet feel better. Does not want to pay for orthotics of any type to be molded to feet.    Blood pressure is acceptable today.      The following portions of the patient's history were reviewed and updated as appropriate: allergies, current medications, past social history and problem list.    Review of Systems   Constitutional: Negative for activity change, appetite change and unexpected weight change.   HENT: Negative for nosebleeds and trouble swallowing.    Eyes: Negative for pain and visual disturbance.   Respiratory: Negative for chest tightness, shortness of breath and wheezing.    Cardiovascular: Negative for chest pain and palpitations. Leg swelling: improved, though still present.   Gastrointestinal: Negative for abdominal pain and blood in stool.   Endocrine: Negative.    Genitourinary: Negative for difficulty urinating and hematuria.   Musculoskeletal: Positive for arthralgias (feet). Negative for joint swelling.   Skin: Negative for color change and rash.   Allergic/Immunologic: Negative.    Neurological: Negative for syncope and speech difficulty.   Hematological: Negative for adenopathy.   Psychiatric/Behavioral: Negative for agitation and confusion.   All other systems reviewed and are negative.      Objective   /80  Pulse 83  Resp 17  Ht 65\" (165.1 cm)  Wt (!) 302 lb (137 kg)  SpO2 96%  BMI 50.26 kg/m2  Physical Exam   Constitutional: She is oriented to person, place, and time. Vital signs are normal. She appears well-developed and well-nourished. No distress.   HENT:   Head: Normocephalic.   Neck: Carotid bruit is not present. No thyromegaly present.   Cardiovascular: Normal rate, regular rhythm " and normal heart sounds.    Pulmonary/Chest: Effort normal and breath sounds normal.   Musculoskeletal: Edema: 1 ankle edema bilat, though no pitting. Also wearing support hose today.    Ria had a diabetic foot exam performed today.   During the foot exam she had a monofilament test performed.    Neurological Sensory Findings -  Unaltered sharp/dull right ankle/foot discrimination and unaltered sharp/dull left ankle/foot discrimination.    Vascular Status -  Her exam exhibits right foot vasculature normal. Her exam exhibits left foot vasculature normal.  Neurological: She is alert and oriented to person, place, and time. Gait normal.   Psychiatric: She has a normal mood and affect. Her behavior is normal. Judgment and thought content normal.   Much happier than at last visit    Vitals reviewed.      Assessment/Plan   Problem List Items Addressed This Visit        Cardiovascular and Mediastinum    Benign hypertension - Primary       Other    Localized edema           Recommend athletic shoes

## 2017-07-13 ENCOUNTER — LAB (OUTPATIENT)
Dept: ENDOCRINOLOGY | Age: 62
End: 2017-07-13

## 2017-07-13 DIAGNOSIS — IMO0001 UNCONTROLLED DIABETES MELLITUS TYPE 2 WITHOUT COMPLICATIONS, UNSPECIFIED LONG TERM INSULIN USE STATUS: ICD-10-CM

## 2017-07-13 DIAGNOSIS — E78.5 DYSLIPIDEMIA: ICD-10-CM

## 2017-07-13 DIAGNOSIS — E66.01 MORBID OBESITY DUE TO EXCESS CALORIES (HCC): ICD-10-CM

## 2017-07-13 DIAGNOSIS — IMO0002 UNCONTROLLED TYPE 2 DIABETES MELLITUS: ICD-10-CM

## 2017-07-13 DIAGNOSIS — I10 BENIGN HYPERTENSION: ICD-10-CM

## 2017-07-14 LAB
ALBUMIN SERPL-MCNC: 4.2 G/DL (ref 3.5–5.2)
ALBUMIN/GLOB SERPL: 1.3 G/DL
ALP SERPL-CCNC: 130 U/L (ref 39–117)
ALT SERPL-CCNC: 27 U/L (ref 1–33)
AST SERPL-CCNC: 24 U/L (ref 1–32)
BILIRUB SERPL-MCNC: 0.4 MG/DL (ref 0.1–1.2)
BUN SERPL-MCNC: 17 MG/DL (ref 8–23)
BUN/CREAT SERPL: 16.5 (ref 7–25)
C PEPTIDE SERPL-MCNC: 1.1 NG/ML (ref 1.1–4.4)
CALCIUM SERPL-MCNC: 9.8 MG/DL (ref 8.6–10.5)
CHLORIDE SERPL-SCNC: 100 MMOL/L (ref 98–107)
CHOLEST SERPL-MCNC: 140 MG/DL (ref 0–200)
CO2 SERPL-SCNC: 25.6 MMOL/L (ref 22–29)
CREAT SERPL-MCNC: 1.03 MG/DL (ref 0.57–1)
GLOBULIN SER CALC-MCNC: 3.2 GM/DL
GLUCOSE SERPL-MCNC: 258 MG/DL (ref 65–99)
HBA1C MFR BLD: 12.1 % (ref 4.8–5.6)
HDLC SERPL-MCNC: 55 MG/DL (ref 40–60)
LDLC SERPL CALC-MCNC: 69 MG/DL (ref 0–100)
MICROALBUMIN UR-MCNC: 204.1 UG/ML
POTASSIUM SERPL-SCNC: 4.5 MMOL/L (ref 3.5–5.2)
PROT SERPL-MCNC: 7.4 G/DL (ref 6–8.5)
SODIUM SERPL-SCNC: 142 MMOL/L (ref 136–145)
T4 SERPL-MCNC: 5.8 MCG/DL (ref 4.5–11.7)
TRIGL SERPL-MCNC: 82 MG/DL (ref 0–150)
TSH SERPL DL<=0.005 MIU/L-ACNC: 3.4 MIU/ML (ref 0.27–4.2)
URATE SERPL-MCNC: 7.6 MG/DL (ref 2.4–5.7)
VLDLC SERPL CALC-MCNC: 16.4 MG/DL (ref 5–40)

## 2017-07-27 ENCOUNTER — OFFICE VISIT (OUTPATIENT)
Dept: ENDOCRINOLOGY | Age: 62
End: 2017-07-27

## 2017-07-27 VITALS
HEIGHT: 65 IN | SYSTOLIC BLOOD PRESSURE: 126 MMHG | WEIGHT: 293 LBS | DIASTOLIC BLOOD PRESSURE: 76 MMHG | BODY MASS INDEX: 48.82 KG/M2

## 2017-07-27 DIAGNOSIS — IMO0001 UNCONTROLLED DIABETES MELLITUS TYPE 2 WITHOUT COMPLICATIONS, UNSPECIFIED LONG TERM INSULIN USE STATUS: Primary | ICD-10-CM

## 2017-07-27 DIAGNOSIS — IMO0001 UNCONTROLLED TYPE 2 DIABETES MELLITUS WITHOUT COMPLICATION, WITH LONG-TERM CURRENT USE OF INSULIN: ICD-10-CM

## 2017-07-27 DIAGNOSIS — I10 BENIGN HYPERTENSION: ICD-10-CM

## 2017-07-27 DIAGNOSIS — E66.01 MORBID OBESITY DUE TO EXCESS CALORIES (HCC): ICD-10-CM

## 2017-07-27 DIAGNOSIS — E78.5 DYSLIPIDEMIA: ICD-10-CM

## 2017-07-27 DIAGNOSIS — I10 ESSENTIAL HYPERTENSION: ICD-10-CM

## 2017-07-27 PROCEDURE — 99215 OFFICE O/P EST HI 40 MIN: CPT | Performed by: INTERNAL MEDICINE

## 2017-07-27 RX ORDER — AMLODIPINE BESYLATE 10 MG/1
10 TABLET ORAL DAILY
Qty: 90 TABLET | Refills: 3 | Status: SHIPPED | OUTPATIENT
Start: 2017-07-27 | End: 2018-06-20 | Stop reason: SDUPTHER

## 2017-07-27 RX ORDER — ROSUVASTATIN CALCIUM 20 MG/1
20 TABLET, COATED ORAL DAILY
Qty: 90 TABLET | Refills: 3 | Status: SHIPPED | OUTPATIENT
Start: 2017-07-27 | End: 2017-11-13 | Stop reason: SDUPTHER

## 2017-07-27 RX ORDER — FUROSEMIDE 40 MG/1
40 TABLET ORAL DAILY
COMMUNITY
Start: 2017-07-26 | End: 2017-12-26

## 2017-07-27 RX ORDER — EXENATIDE 2 MG/.65ML
2 INJECTION, SUSPENSION, EXTENDED RELEASE SUBCUTANEOUS WEEKLY
Qty: 12 EACH | Refills: 3 | Status: SHIPPED | OUTPATIENT
Start: 2017-07-27 | End: 2018-06-06 | Stop reason: SDUPTHER

## 2017-07-27 RX ORDER — DAPAGLIFLOZIN AND METFORMIN HYDROCHLORIDE 5; 1000 MG/1; MG/1
2 TABLET, FILM COATED, EXTENDED RELEASE ORAL DAILY
Qty: 180 TABLET | Refills: 3 | Status: SHIPPED | OUTPATIENT
Start: 2017-07-27 | End: 2018-06-26

## 2017-07-27 NOTE — PROGRESS NOTES
Subjective   Ria Chan is a 61 y.o. female is here for a follow up for DM2, hyperlipidemia and HTN. Lab review. Patient is testing BG 2 to 3 times daily and brought meter. Patient denies any issues or concerns.     History of Present Illness this is a 61-year-old female known patient with type II diabetes hypertension and dyslipidemia and vitamin D deficiency.  Over the course of last 4 months she has had no significant health problems for which to go to emergency room or hospital.    The following portions of the patient's history were reviewed and updated as appropriate: allergies, current medications, past family history, past medical history, past social history, past surgical history and problem list.    Review of Systems   Constitutional: Negative for fatigue.   HENT: Negative for trouble swallowing.    Eyes: Negative for visual disturbance.   Respiratory: Negative for shortness of breath.    Cardiovascular: Negative for leg swelling.   Endocrine: Negative for polyphagia.   Skin: Negative for wound.   Neurological: Negative for numbness.       Objective   Physical Exam   Constitutional: She is oriented to person, place, and time. She appears well-developed and well-nourished. No distress.   HENT:   Head: Normocephalic and atraumatic.   Right Ear: External ear normal.   Left Ear: External ear normal.   Nose: Nose normal.   Mouth/Throat: Oropharynx is clear and moist. No oropharyngeal exudate.   Eyes: Conjunctivae and EOM are normal. Pupils are equal, round, and reactive to light. Right eye exhibits no discharge. Left eye exhibits no discharge. No scleral icterus.   Neck: Normal range of motion. Neck supple. No JVD present. No tracheal deviation present. No thyromegaly present.   Cardiovascular: Normal rate, regular rhythm, normal heart sounds and intact distal pulses.  Exam reveals no gallop and no friction rub.    No murmur heard.  Pulmonary/Chest: Effort normal and breath sounds normal. No stridor. No  respiratory distress. She has no wheezes. She has no rales. She exhibits no tenderness.   Abdominal: Soft. Bowel sounds are normal. She exhibits no distension and no mass. There is no tenderness. There is no rebound and no guarding. No hernia.   Musculoskeletal: Normal range of motion. She exhibits no edema, tenderness or deformity.   Lymphadenopathy:     She has no cervical adenopathy.   Neurological: She is alert and oriented to person, place, and time. She has normal reflexes. She displays normal reflexes. No cranial nerve deficit. She exhibits normal muscle tone. Coordination normal.   Skin: Skin is warm and dry. No rash noted. She is not diaphoretic. No erythema. No pallor.   Acanthosis nigricans around her neck   Psychiatric: She has a normal mood and affect. Her behavior is normal. Judgment and thought content normal.   Nursing note and vitals reviewed.        Assessment/Plan   Diagnoses and all orders for this visit:    Uncontrolled diabetes mellitus type 2 without complications, unspecified long term insulin use status  -     T3, Free; Future  -     T4 & TSH ([facility]); Future  -     T4, Free; Future  -     Uric Acid; Future  -     Vitamin D 25 Hydroxy; Future  -     Comprehensive Metabolic Panel; Future  -     C-Peptide; Future  -     Hemoglobin A1c; Future  -     Lipid Panel; Future  -     MicroAlbumin, Urine, Random; Future    Dyslipidemia  -     T3, Free; Future  -     T4 & TSH ([facility]); Future  -     T4, Free; Future  -     Uric Acid; Future  -     Vitamin D 25 Hydroxy; Future  -     Comprehensive Metabolic Panel; Future  -     C-Peptide; Future  -     Hemoglobin A1c; Future  -     Lipid Panel; Future  -     MicroAlbumin, Urine, Random; Future    Morbid obesity due to excess calories  -     rosuvastatin (CRESTOR) 20 MG tablet; Take 1 tablet by mouth Daily.  -     amLODIPine (NORVASC) 10 MG tablet; Take 1 tablet by mouth Daily.  -     T3, Free; Future  -     T4 & TSH ([facility]); Future  -     T4, Free;  Future  -     Uric Acid; Future  -     Vitamin D 25 Hydroxy; Future  -     Comprehensive Metabolic Panel; Future  -     C-Peptide; Future  -     Hemoglobin A1c; Future  -     Lipid Panel; Future  -     MicroAlbumin, Urine, Random; Future    Benign hypertension  -     T3, Free; Future  -     T4 & TSH ([facility]); Future  -     T4, Free; Future  -     Uric Acid; Future  -     Vitamin D 25 Hydroxy; Future  -     Comprehensive Metabolic Panel; Future  -     C-Peptide; Future  -     Hemoglobin A1c; Future  -     Lipid Panel; Future  -     MicroAlbumin, Urine, Random; Future    Uncontrolled type 2 diabetes mellitus without complication, with long-term current use of insulin  -     rosuvastatin (CRESTOR) 20 MG tablet; Take 1 tablet by mouth Daily.  -     T3, Free; Future  -     T4 & TSH ([facility]); Future  -     T4, Free; Future  -     Uric Acid; Future  -     Vitamin D 25 Hydroxy; Future  -     Comprehensive Metabolic Panel; Future  -     C-Peptide; Future  -     Hemoglobin A1c; Future  -     Lipid Panel; Future  -     MicroAlbumin, Urine, Random; Future    Essential hypertension  -     amLODIPine (NORVASC) 10 MG tablet; Take 1 tablet by mouth Daily.  -     T3, Free; Future  -     T4 & TSH ([facility]); Future  -     T4, Free; Future  -     Uric Acid; Future  -     Vitamin D 25 Hydroxy; Future  -     Comprehensive Metabolic Panel; Future  -     C-Peptide; Future  -     Hemoglobin A1c; Future  -     Lipid Panel; Future  -     MicroAlbumin, Urine, Random; Future    Other orders  -     Insulin Glargine (TOUJEO SOLOSTAR) 300 UNIT/ML solution pen-injector; Inject 225 Units under the skin Daily.  -     BYDUREON 2 MG pen-injector; Inject 2 mg under the skin 1 (One) Time Per Week.  -     XIGDUO XR 5-1000 MG tablet sustained-release 24 hour; Take 2 tablets by mouth Daily.               In summary I saw and examined this 61-year-old female for above-mentioned problems.  I reviewed her laboratory evaluation of 07/13/2017 and provided her  with a hard copy of it.  Her hemoglobin A1c unacceptably high at 12.1 and therefore we will go ahead and switch her diabetic management to: Toujeo 225 units every morning, Bydureon 2 mg injection once a week and Xidduo 5/1000 mg for 4 weeks and then 2 tablets in the morning.  I also asked her to drink plenty of fluids and cleanse her genital area after the use of toilet and sexual intercourse.  She will continue rest of her medications and will see Ms. Frances Ball in 4 months or sooner if needed with laboratory evaluation prior to this office visit.  This office visit including patient counseling which was more than 50% of the time exceeded 40 minutes.

## 2017-08-08 ENCOUNTER — TELEPHONE (OUTPATIENT)
Dept: ENDOCRINOLOGY | Age: 62
End: 2017-08-08

## 2017-08-08 NOTE — TELEPHONE ENCOUNTER
----- Message from Lynnette Cruz sent at 8/8/2017  7:50 AM EDT -----  Patient doesn't need a Prior Authorization for this medication, already available  under the patient plan.     Thanks   ----- Message -----     From: Fabiola Aldana MA     Sent: 8/7/2017   8:46 AM       To: Lynnette Cruz    Patient is requesting a PA for Toujeo.  She states that the medication is working very well for her and keeping her BG under control.

## 2017-08-10 ENCOUNTER — TELEPHONE (OUTPATIENT)
Dept: ENDOCRINOLOGY | Age: 62
End: 2017-08-10

## 2017-08-10 DIAGNOSIS — IMO0001 UNCONTROLLED TYPE 2 DIABETES MELLITUS WITHOUT COMPLICATION, WITH LONG-TERM CURRENT USE OF INSULIN: ICD-10-CM

## 2017-08-10 NOTE — TELEPHONE ENCOUNTER
----- Message from Jesus Carrillo MD sent at 8/9/2017  4:30 PM EDT -----  This is correct dosing and therefore she will need to 1 pen for each 2 days and 45 pens for 3 months supply.  ----- Message -----     From: Fabiola Aldana MA     Sent: 8/9/2017  12:11 PM       To: Jesus Carrillo MD    Patient's mail order pharmacy is questioning RX for Toujeo and the dosing information. RX was sent for 225 units under the skin daily. Is this the correct dose for the patient?

## 2017-08-18 ENCOUNTER — OFFICE VISIT (OUTPATIENT)
Dept: FAMILY MEDICINE CLINIC | Facility: CLINIC | Age: 62
End: 2017-08-18

## 2017-08-18 VITALS
SYSTOLIC BLOOD PRESSURE: 140 MMHG | HEART RATE: 92 BPM | DIASTOLIC BLOOD PRESSURE: 80 MMHG | OXYGEN SATURATION: 98 % | BODY MASS INDEX: 48.82 KG/M2 | RESPIRATION RATE: 18 BRPM | HEIGHT: 65 IN | WEIGHT: 293 LBS

## 2017-08-18 DIAGNOSIS — M79.602 PAIN, ARM, LEFT: Primary | ICD-10-CM

## 2017-08-18 PROCEDURE — 99213 OFFICE O/P EST LOW 20 MIN: CPT | Performed by: FAMILY MEDICINE

## 2017-08-18 RX ORDER — TRAMADOL HYDROCHLORIDE 50 MG/1
50 TABLET ORAL EVERY 6 HOURS PRN
Qty: 40 TABLET | Refills: 0 | Status: SHIPPED | OUTPATIENT
Start: 2017-08-18 | End: 2017-12-26

## 2017-08-18 NOTE — PROGRESS NOTES
"Subjective   Ria Chan is a 61 y.o. female.     History of Present Illness Pain in R arm . It is \"all over\" with lifting. R neck hurts, but she says it is not her shoulder.   Works at [employer] and does some repetitive lifting of papers. Also uses computer.   Did have an injury to r rotator cuff many years ago. Did go to PT.  Keeps her awake at night.  Hx elevated Cr. Cannot take NSAIDs.  The following portions of the patient's history were reviewed and updated as appropriate: allergies, current medications, past social history and problem list.    Review of Systems   Constitutional: Negative.    Respiratory: Negative.    Cardiovascular: Negative.    Musculoskeletal: Positive for arthralgias (R upper arm, marleen at night), myalgias, neck pain (marleen at night) and neck stiffness.   Psychiatric/Behavioral: Positive for sleep disturbance (pain). The patient is not nervous/anxious.        Objective   /80  Pulse 92  Resp 18  Ht 65\" (165.1 cm)  Wt 298 lb (135 kg)  SpO2 98%  BMI 49.59 kg/m2  Physical Exam   Constitutional: She appears well-developed.   Cardiovascular: Normal rate.    Pulmonary/Chest: Effort normal.   Musculoskeletal: She exhibits tenderness (C spine nontender. Paracervb muscles nontender. FROM without pain. R shoulder nontender entire rotator cuff. ).   Abduction to 45 degrees then too painful, but states pain is \"deep in arm\" not in muscles or shoulder.. No pain with biceps triceps movements.  Strength is wnl.   Neurological: She displays normal reflexes. She exhibits normal muscle tone.   Nursing note and vitals reviewed.      Assessment/Plan   Problem List Items Addressed This Visit     None      Visit Diagnoses     Pain, arm, left    -  Primary    Relevant Medications    traMADol (ULTRAM) 50 MG tablet    Other Relevant Orders    Ambulatory Referral to Physical Therapy    Ambulatory Referral to Orthopedic Surgery           Use ice pack to arm.  Avoid repetitive motions with R " arm/

## 2017-09-11 ENCOUNTER — APPOINTMENT (OUTPATIENT)
Dept: PHYSICAL THERAPY | Facility: HOSPITAL | Age: 62
End: 2017-09-11

## 2017-09-25 ENCOUNTER — OFFICE VISIT (OUTPATIENT)
Dept: ORTHOPEDIC SURGERY | Facility: CLINIC | Age: 62
End: 2017-09-25

## 2017-09-25 VITALS — HEIGHT: 65 IN | BODY MASS INDEX: 48.82 KG/M2 | WEIGHT: 293 LBS | TEMPERATURE: 98.4 F

## 2017-09-25 DIAGNOSIS — M25.511 RIGHT SHOULDER PAIN, UNSPECIFIED CHRONICITY: Primary | ICD-10-CM

## 2017-09-25 DIAGNOSIS — IMO0002 BURSITIS/TENDONITIS, SHOULDER: ICD-10-CM

## 2017-09-25 PROCEDURE — 73030 X-RAY EXAM OF SHOULDER: CPT | Performed by: ORTHOPAEDIC SURGERY

## 2017-09-25 PROCEDURE — 99203 OFFICE O/P NEW LOW 30 MIN: CPT | Performed by: ORTHOPAEDIC SURGERY

## 2017-09-25 PROCEDURE — 20610 DRAIN/INJ JOINT/BURSA W/O US: CPT | Performed by: ORTHOPAEDIC SURGERY

## 2017-09-25 RX ADMIN — METHYLPREDNISOLONE ACETATE 80 MG: 80 INJECTION, SUSPENSION INTRA-ARTICULAR; INTRALESIONAL; INTRAMUSCULAR; SOFT TISSUE at 16:39

## 2017-09-25 RX ADMIN — BUPIVACAINE HYDROCHLORIDE 4 ML: 5 INJECTION, SOLUTION EPIDURAL; INTRACAUDAL at 16:39

## 2017-10-04 RX ORDER — METHYLPREDNISOLONE ACETATE 80 MG/ML
80 INJECTION, SUSPENSION INTRA-ARTICULAR; INTRALESIONAL; INTRAMUSCULAR; SOFT TISSUE
Status: COMPLETED | OUTPATIENT
Start: 2017-09-25 | End: 2017-09-25

## 2017-10-04 RX ORDER — BUPIVACAINE HYDROCHLORIDE 5 MG/ML
4 INJECTION, SOLUTION EPIDURAL; INTRACAUDAL
Status: COMPLETED | OUTPATIENT
Start: 2017-09-25 | End: 2017-09-25

## 2017-11-06 ENCOUNTER — OFFICE VISIT (OUTPATIENT)
Dept: ORTHOPEDIC SURGERY | Facility: CLINIC | Age: 62
End: 2017-11-06

## 2017-11-06 VITALS — WEIGHT: 293 LBS | BODY MASS INDEX: 48.82 KG/M2 | TEMPERATURE: 98.9 F | HEIGHT: 65 IN

## 2017-11-06 DIAGNOSIS — IMO0002 BURSITIS/TENDONITIS, SHOULDER: Primary | ICD-10-CM

## 2017-11-06 DIAGNOSIS — M65.311 TRIGGER FINGER OF RIGHT THUMB: ICD-10-CM

## 2017-11-06 PROCEDURE — 20600 DRAIN/INJ JOINT/BURSA W/O US: CPT | Performed by: ORTHOPAEDIC SURGERY

## 2017-11-06 PROCEDURE — 99213 OFFICE O/P EST LOW 20 MIN: CPT | Performed by: ORTHOPAEDIC SURGERY

## 2017-11-06 RX ORDER — BUPIVACAINE HYDROCHLORIDE 5 MG/ML
2 INJECTION, SOLUTION EPIDURAL; INTRACAUDAL
Status: COMPLETED | OUTPATIENT
Start: 2017-11-06 | End: 2017-11-06

## 2017-11-06 RX ORDER — METHYLPREDNISOLONE ACETATE 80 MG/ML
80 INJECTION, SUSPENSION INTRA-ARTICULAR; INTRALESIONAL; INTRAMUSCULAR; SOFT TISSUE
Status: COMPLETED | OUTPATIENT
Start: 2017-11-06 | End: 2017-11-06

## 2017-11-06 RX ADMIN — BUPIVACAINE HYDROCHLORIDE 2 ML: 5 INJECTION, SOLUTION EPIDURAL; INTRACAUDAL at 14:20

## 2017-11-06 RX ADMIN — METHYLPREDNISOLONE ACETATE 80 MG: 80 INJECTION, SUSPENSION INTRA-ARTICULAR; INTRALESIONAL; INTRAMUSCULAR; SOFT TISSUE at 14:20

## 2017-11-06 NOTE — PROGRESS NOTES
"Shoulder Follow Up      Patient: Ria Chan        YOB: 1955            Chief Complaints:right Shoulder pain and right trigger thumb  Chief Complaint   Patient presents with   • Right Arm - Pain, Follow-up           History of Present Illness:Patient is here follow right shoulder pain the shoulder joint great no pain at all happy with where she is she is complaining of some problems with her thumb states is \"going out of place.\"  Noted history injury change in activities been ongoing for several weeks.      Physical Exam: 61 y.o. female  General Appearance:    Alert, cooperative, in no acute distress                   Vitals:    11/06/17 1320   Temp: 98.9 °F (37.2 °C)   TempSrc: Temporal Artery    Weight: (!) 307 lb (139 kg)   Height: 65\" (165.1 cm)        Patient is alert and read ×3 no acute distress appears her above-listed at height weight and age.  Affect is normal respiratory rate is normal unlabored. Heart rate regular rate rhythm, sclera, dentition and hearing are normal for the purpose of this exam.      Ortho Exam  Physical exam of the right shoulder reveals no overlying skin changes no lymphedema no lymphadenopathy.  Patient has active flexion 180 with mild symptoms abduction is similar external rotation is to 50 and internal rotation to the upper lumbar spine with mild symptoms.  Patient has good rotator cuff strength 4+ over 5 with isometric strength testing with pain.  Patient has a positive impingement and a positive Tamez sign.  Patient has good cervical range of motion which is full and asymptomatic no radicular symptoms.  Patient has a normal elbow exam.  Good distal pulses are present  Patient has pain with overhead activity and a positive Neer sign and a positive empty can sign  Exam of the right thumb she does have triggering at the A1 pulley neurologically intact      Assessment/Plan:    Right shoulder pain which has resolved with conservative management also trigger " thumb I think it's reasonable to inject near the A1 pulley which was done avoiding the tendon I will put an AlumaFoam splint at night it does not resolve she will give me a call    Small Joint Arthrocentesis  Consent given by: patient  Site marked: site marked  Timeout: Immediately prior to procedure a time out was called to verify the correct patient, procedure, equipment, support staff and site/side marked as required   Supporting Documentation  Indications: pain and joint swelling   Procedure Details  Location: thumb - R thumb MCP  Preparation: Patient was prepped and draped in the usual sterile fashion  Needle size: 25 G  Approach: volar  Medications administered: 80 mg methylPREDNISolone acetate 80 MG/ML; 2 mL bupivacaine (PF) 0.5 %

## 2017-11-13 DIAGNOSIS — E66.01 MORBID OBESITY DUE TO EXCESS CALORIES (HCC): ICD-10-CM

## 2017-11-13 DIAGNOSIS — IMO0001 UNCONTROLLED TYPE 2 DIABETES MELLITUS WITHOUT COMPLICATION, WITH LONG-TERM CURRENT USE OF INSULIN: ICD-10-CM

## 2017-11-13 RX ORDER — ROSUVASTATIN CALCIUM 20 MG/1
20 TABLET, COATED ORAL DAILY
Qty: 90 TABLET | Refills: 4 | Status: SHIPPED | OUTPATIENT
Start: 2017-11-13 | End: 2019-01-15

## 2017-11-15 ENCOUNTER — RESULTS ENCOUNTER (OUTPATIENT)
Dept: ENDOCRINOLOGY | Age: 62
End: 2017-11-15

## 2017-11-15 DIAGNOSIS — E66.01 MORBID OBESITY DUE TO EXCESS CALORIES (HCC): ICD-10-CM

## 2017-11-15 DIAGNOSIS — I10 BENIGN HYPERTENSION: ICD-10-CM

## 2017-11-15 DIAGNOSIS — I10 ESSENTIAL HYPERTENSION: ICD-10-CM

## 2017-11-15 DIAGNOSIS — E78.5 DYSLIPIDEMIA: ICD-10-CM

## 2017-11-15 DIAGNOSIS — IMO0001 UNCONTROLLED TYPE 2 DIABETES MELLITUS WITHOUT COMPLICATION, WITH LONG-TERM CURRENT USE OF INSULIN: ICD-10-CM

## 2017-11-15 DIAGNOSIS — IMO0001 UNCONTROLLED DIABETES MELLITUS TYPE 2 WITHOUT COMPLICATIONS, UNSPECIFIED LONG TERM INSULIN USE STATUS: ICD-10-CM

## 2017-12-15 ENCOUNTER — LAB (OUTPATIENT)
Dept: ENDOCRINOLOGY | Age: 62
End: 2017-12-15

## 2017-12-15 DIAGNOSIS — I10 ESSENTIAL HYPERTENSION: ICD-10-CM

## 2017-12-15 DIAGNOSIS — E55.9 VITAMIN D DEFICIENCY: ICD-10-CM

## 2017-12-15 DIAGNOSIS — IMO0001 UNCONTROLLED TYPE 2 DIABETES MELLITUS WITHOUT COMPLICATION, WITH LONG-TERM CURRENT USE OF INSULIN: ICD-10-CM

## 2017-12-15 DIAGNOSIS — IMO0001 UNCONTROLLED DIABETES MELLITUS TYPE 2 WITHOUT COMPLICATIONS, UNSPECIFIED LONG TERM INSULIN USE STATUS: ICD-10-CM

## 2017-12-15 DIAGNOSIS — E78.5 DYSLIPIDEMIA: ICD-10-CM

## 2017-12-15 DIAGNOSIS — I10 BENIGN HYPERTENSION: ICD-10-CM

## 2017-12-15 DIAGNOSIS — E66.01 MORBID OBESITY DUE TO EXCESS CALORIES (HCC): ICD-10-CM

## 2017-12-15 DIAGNOSIS — IMO0001 UNCONTROLLED TYPE 2 DIABETES MELLITUS WITHOUT COMPLICATION, WITH LONG-TERM CURRENT USE OF INSULIN: Primary | ICD-10-CM

## 2017-12-16 LAB
25(OH)D3+25(OH)D2 SERPL-MCNC: 39 NG/ML (ref 30–100)
ALBUMIN SERPL-MCNC: 4.2 G/DL (ref 3.5–5.2)
ALBUMIN/GLOB SERPL: 1.3 G/DL
ALP SERPL-CCNC: 89 U/L (ref 39–117)
ALT SERPL-CCNC: 16 U/L (ref 1–33)
AST SERPL-CCNC: 10 U/L (ref 1–32)
BILIRUB SERPL-MCNC: 0.2 MG/DL (ref 0.1–1.2)
BUN SERPL-MCNC: 20 MG/DL (ref 8–23)
BUN/CREAT SERPL: 17.4 (ref 7–25)
C PEPTIDE SERPL-MCNC: 2 NG/ML (ref 1.1–4.4)
CALCIUM SERPL-MCNC: 8.9 MG/DL (ref 8.6–10.5)
CHLORIDE SERPL-SCNC: 101 MMOL/L (ref 98–107)
CHOLEST SERPL-MCNC: 129 MG/DL (ref 0–200)
CO2 SERPL-SCNC: 30.5 MMOL/L (ref 22–29)
CREAT SERPL-MCNC: 1.15 MG/DL (ref 0.57–1)
GFR SERPLBLD CREATININE-BSD FMLA CKD-EPI: 48 ML/MIN/1.73
GFR SERPLBLD CREATININE-BSD FMLA CKD-EPI: 58 ML/MIN/1.73
GLOBULIN SER CALC-MCNC: 3.3 GM/DL
GLUCOSE SERPL-MCNC: 101 MG/DL (ref 65–99)
HBA1C MFR BLD: 6.86 % (ref 4.8–5.6)
HDLC SERPL-MCNC: 51 MG/DL (ref 40–60)
INTERPRETATION: NORMAL
LDLC SERPL CALC-MCNC: 61 MG/DL (ref 0–100)
Lab: NORMAL
MICROALBUMIN UR-MCNC: 198 UG/ML
POTASSIUM SERPL-SCNC: 4.3 MMOL/L (ref 3.5–5.2)
PROT SERPL-MCNC: 7.5 G/DL (ref 6–8.5)
SODIUM SERPL-SCNC: 143 MMOL/L (ref 136–145)
TRIGL SERPL-MCNC: 83 MG/DL (ref 0–150)
VLDLC SERPL CALC-MCNC: 16.6 MG/DL (ref 5–40)

## 2017-12-26 ENCOUNTER — OFFICE VISIT (OUTPATIENT)
Dept: FAMILY MEDICINE CLINIC | Facility: CLINIC | Age: 62
End: 2017-12-26

## 2017-12-26 VITALS
DIASTOLIC BLOOD PRESSURE: 74 MMHG | OXYGEN SATURATION: 98 % | WEIGHT: 293 LBS | TEMPERATURE: 98.7 F | BODY MASS INDEX: 48.82 KG/M2 | SYSTOLIC BLOOD PRESSURE: 128 MMHG | HEIGHT: 65 IN | HEART RATE: 78 BPM

## 2017-12-26 DIAGNOSIS — E11.22 CONTROLLED TYPE 2 DIABETES MELLITUS WITH STAGE 3 CHRONIC KIDNEY DISEASE, WITH LONG-TERM CURRENT USE OF INSULIN (HCC): ICD-10-CM

## 2017-12-26 DIAGNOSIS — Z79.4 CONTROLLED TYPE 2 DIABETES MELLITUS WITH STAGE 3 CHRONIC KIDNEY DISEASE, WITH LONG-TERM CURRENT USE OF INSULIN (HCC): ICD-10-CM

## 2017-12-26 DIAGNOSIS — N18.30 CONTROLLED TYPE 2 DIABETES MELLITUS WITH STAGE 3 CHRONIC KIDNEY DISEASE, WITH LONG-TERM CURRENT USE OF INSULIN (HCC): ICD-10-CM

## 2017-12-26 DIAGNOSIS — E66.01 MORBID OBESITY (HCC): Primary | ICD-10-CM

## 2017-12-26 PROCEDURE — 99214 OFFICE O/P EST MOD 30 MIN: CPT | Performed by: FAMILY MEDICINE

## 2017-12-26 NOTE — PROGRESS NOTES
Subjective   Ria Chan is a 62 y.o. female.     Chief Complaint   Patient presents with   • Diabetes Mellitus     follow up pt is doing well complain about gainnig weight lately   • Establish Care     new pt establishing today in office    Obesity    HPI       Pt is a pleasant 62 YOF here to discuss obesity, DM and establish care.  She has a PMH of DM TII, HLD, and obesity with a BMI of 51. She had labs performed 12/15/17 and wanted to discuss the results.    Diabetes Mellitus TII: stable, managed by Endocrinology   Last HbA1c: 6.86 performed on 12/16/17  Complications of: Nephropathy  Labs: Last done fasting labs/lipids 12/15/17  Eye exam: 2/2017  PNA Vaccine: Declined  Flu shot: Declined  Meds: Managed by Endo: on Xigduo, Bydureon, Toujeo, Atorvastatin dn Losartan.     Exercising with circuit training and weight lifitng 2 X per week,   Diet:   Breakfast: oatmeal/ long/ hash browns.    Lunch: Leftovers from dinner/ sandwich.  Some meat from the night before woith some salad and fruid  Dinner: Veggies (something green), sweet potato, baked potato, meat, fish or chicken or pork chop.   Drinks water only  Occational sweet weekly, eats more fruit.     Mammo and PAP with GYN Dr Adelina Torrez - scheduled for this week.   C-Scope: 2015 - [place], FU 10 years  DEXA: 5 years ago, normal     The following portions of the patient's history were reviewed:  [x] Allergies   [x] Current Medications  [x] Past Family History   [x] Past Medical History  [x] Past Social History   [x] Past Surgical History  [x] Problem List    Review of Systems   Constitutional: Positive for unexpected weight change.   HENT: Negative.    Eyes: Negative.    Respiratory: Negative.    Cardiovascular: Negative.    Gastrointestinal: Negative.    Endocrine: Negative.    Genitourinary: Negative.    Musculoskeletal: Positive for arthralgias.   Allergic/Immunologic: Positive for environmental allergies.   Hematological: Negative.     Psychiatric/Behavioral: Negative.        Objective  Vitals:    12/26/17 1233   BP: 128/74   Pulse: 78   Temp: 98.7 °F (37.1 °C)   SpO2: 98%        Physical Exam   Constitutional: She is oriented to person, place, and time. She appears well-developed and well-nourished. No distress.   HENT:   Head: Normocephalic.   Nose: Nose normal.   Eyes: EOM are normal.   Cardiovascular: Normal rate, regular rhythm, normal heart sounds and intact distal pulses.    No murmur heard.  Pulmonary/Chest: Effort normal and breath sounds normal. No respiratory distress.   Musculoskeletal: Normal range of motion.   Neurological: She is alert and oriented to person, place, and time.   Skin: Skin is warm and dry. No rash noted.   Psychiatric: She has a normal mood and affect. Her behavior is normal. Judgment and thought content normal.   Nursing note and vitals reviewed.        Current Outpatient Prescriptions:   •  amLODIPine (NORVASC) 10 MG tablet, Take 1 tablet by mouth Daily., Disp: 90 tablet, Rfl: 3  •  aspirin 81 MG tablet, Take 81 mg by mouth daily., Disp: , Rfl:   •  BD PEN NEEDLE SERVANDO U/F 32G X 4 MM misc, 4 (four) times a day., Disp: , Rfl:   •  BYDUREON 2 MG pen-injector, Inject 2 mg under the skin 1 (One) Time Per Week., Disp: 12 each, Rfl: 3  •  Insulin Glargine (TOUJEO SOLOSTAR) 300 UNIT/ML solution pen-injector, Inject 225 Units under the skin Daily. (Patient taking differently: Inject 160 Units under the skin Daily.), Disp: 45 pen, Rfl: 0  •  losartan (COZAAR) 25 MG tablet, Take 1 tablet by mouth Daily., Disp: 90 tablet, Rfl: 4  •  Multiple Vitamins-Minerals (MULTIVITAMIN ADULT PO), Take 1 tablet by mouth daily., Disp: , Rfl:   •  ONE TOUCH ULTRA TEST test strip, 1 each by Other route 3 (three) times a day. Use as instructed, Disp: , Rfl:   •  rosuvastatin (CRESTOR) 20 MG tablet, Take 1 tablet by mouth Daily., Disp: 90 tablet, Rfl: 4  •  triamterene-hydrochlorothiazide (MAXZIDE-25) 37.5-25 MG per tablet, Take 1 tablet by  mouth Daily., Disp: 90 tablet, Rfl: 3  •  XIGDUO XR 5-1000 MG tablet sustained-release 24 hour, Take 2 tablets by mouth Daily., Disp: 180 tablet, Rfl: 3    Procedures    Lab Results (most recent)     None          Assessment/Plan   Ria was seen today for diabetes mellitus and establish care.    Diagnoses and all orders for this visit:    Morbid obesity    Controlled type 2 diabetes mellitus with stage 3 chronic kidney disease, with long-term current use of insulin      No change to DM meds at this time.  Plan to exercise 4 times a week, stationary bike/ pool/ circuit training.  OK to do a more plant based diet - over all eating well.  Decrease carb intake.        35 minutes was spent of the 45 minute visit in direct counseling and coordination of care regarding:   Encounter Diagnoses   Name Primary?   • Morbid obesity Yes   • Controlled type 2 diabetes mellitus with stage 3 chronic kidney disease, with long-term current use of insulin          Return in about 6 months (around 6/26/2018).      Sharonda Rodgers MD

## 2017-12-27 ENCOUNTER — APPOINTMENT (OUTPATIENT)
Dept: WOMENS IMAGING | Facility: HOSPITAL | Age: 62
End: 2017-12-27

## 2017-12-27 PROCEDURE — 77063 BREAST TOMOSYNTHESIS BI: CPT | Performed by: RADIOLOGY

## 2017-12-27 PROCEDURE — 77067 SCR MAMMO BI INCL CAD: CPT | Performed by: RADIOLOGY

## 2017-12-29 ENCOUNTER — OFFICE VISIT (OUTPATIENT)
Dept: ENDOCRINOLOGY | Age: 62
End: 2017-12-29

## 2017-12-29 VITALS
SYSTOLIC BLOOD PRESSURE: 132 MMHG | DIASTOLIC BLOOD PRESSURE: 76 MMHG | HEIGHT: 65 IN | BODY MASS INDEX: 48.82 KG/M2 | WEIGHT: 293 LBS

## 2017-12-29 DIAGNOSIS — N18.30 CONTROLLED TYPE 2 DIABETES MELLITUS WITH STAGE 3 CHRONIC KIDNEY DISEASE, WITH LONG-TERM CURRENT USE OF INSULIN (HCC): Primary | ICD-10-CM

## 2017-12-29 DIAGNOSIS — Z79.4 CONTROLLED TYPE 2 DIABETES MELLITUS WITH STAGE 3 CHRONIC KIDNEY DISEASE, WITH LONG-TERM CURRENT USE OF INSULIN (HCC): Primary | ICD-10-CM

## 2017-12-29 DIAGNOSIS — E78.5 DYSLIPIDEMIA: ICD-10-CM

## 2017-12-29 DIAGNOSIS — E11.22 CONTROLLED TYPE 2 DIABETES MELLITUS WITH STAGE 3 CHRONIC KIDNEY DISEASE, WITH LONG-TERM CURRENT USE OF INSULIN (HCC): Primary | ICD-10-CM

## 2017-12-29 DIAGNOSIS — E66.01 MORBID OBESITY (HCC): ICD-10-CM

## 2017-12-29 DIAGNOSIS — I10 BENIGN HYPERTENSION: ICD-10-CM

## 2017-12-29 PROCEDURE — 99214 OFFICE O/P EST MOD 30 MIN: CPT | Performed by: NURSE PRACTITIONER

## 2017-12-29 NOTE — PROGRESS NOTES
"Subjective   Ria Chan is a 62 y.o. female is here today for follow-up.  Chief Complaint   Patient presents with   • Diabetes     recent labs, pt tests BG 2x/day, pt brought meter   • Hyperlipidemia     pt concerned about weight gain   • Hypertension   • Obesity     /76  Ht 165.1 cm (65\")  Wt (!) 141 kg (310 lb 8 oz)  BMI 51.67 kg/m2  Current Outpatient Prescriptions on File Prior to Visit   Medication Sig   • amLODIPine (NORVASC) 10 MG tablet Take 1 tablet by mouth Daily.   • aspirin 81 MG tablet Take 81 mg by mouth daily.   • BD PEN NEEDLE SERVANDO U/F 32G X 4 MM misc 4 (four) times a day.   • BYDUREON 2 MG pen-injector Inject 2 mg under the skin 1 (One) Time Per Week.   • Insulin Glargine (TOUJEO SOLOSTAR) 300 UNIT/ML solution pen-injector Inject 225 Units under the skin Daily. (Patient taking differently: Inject 160 Units under the skin Daily.)   • losartan (COZAAR) 25 MG tablet Take 1 tablet by mouth Daily.   • Multiple Vitamins-Minerals (MULTIVITAMIN ADULT PO) Take 1 tablet by mouth daily.   • ONE TOUCH ULTRA TEST test strip 1 each by Other route 3 (three) times a day. Use as instructed   • rosuvastatin (CRESTOR) 20 MG tablet Take 1 tablet by mouth Daily.   • triamterene-hydrochlorothiazide (MAXZIDE-25) 37.5-25 MG per tablet Take 1 tablet by mouth Daily.   • XIGDUO XR 5-1000 MG tablet sustained-release 24 hour Take 2 tablets by mouth Daily.     No current facility-administered medications on file prior to visit.      Family History   Problem Relation Age of Onset   • Diabetes Father    • Hypertension Father    • Kidney disease Father    • Uterine cancer Mother      metastatic    • Uterine cancer Maternal Grandmother    • Osteoarthritis Sister    • Breast cancer Neg Hx    • Colon cancer Neg Hx    • Heart attack Neg Hx    • Stroke Neg Hx      Allergies   Allergen Reactions   • Aliskiren Other (See Comments)     Causes patient to go into a coma   • Ace Inhibitors Angioedema   • Diclofenac Angioedema "   • Sulfamethoxazole-Trimethoprim Nausea Only         Diabetes   She has type 2 diabetes mellitus. MedicAlert identification noted. The initial diagnosis of diabetes was made 17 years ago. Pertinent negatives for hypoglycemia include no confusion, dizziness, headaches, hunger, mood changes, nervousness/anxiousness, pallor, seizures, sleepiness, speech difficulty, sweats or tremors. Pertinent negatives for diabetes include no blurred vision, no chest pain, no fatigue, no foot paresthesias, no foot ulcerations, no polydipsia, no polyphagia, no polyuria, no visual change, no weakness and no weight loss. Hypoglycemia complications include nocturnal hypoglycemia. Pertinent negatives for hypoglycemia complications include no blackouts, no hospitalization, no required assistance and no required glucagon injection. Symptoms are stable. Pertinent negatives for diabetic complications include no CVA, heart disease, impotence, nephropathy, peripheral neuropathy, PVD or retinopathy. Risk factors for coronary artery disease include obesity. Current diabetic treatment includes insulin injections and oral agent (triple therapy). She is compliant with treatment all of the time. She is currently taking insulin pre-breakfast. Insulin injections are given by patient. Rotation sites for injection include the abdominal wall and arms. Her weight is increasing steadily. She is following a generally healthy diet. Meal planning includes carbohydrate counting. She has not had a previous visit with a dietitian. She participates in exercise three times a week. She monitors blood glucose at home 3-4 x per day. Blood glucose monitoring compliance is excellent. Her home blood glucose trend is decreasing steadily. Her breakfast blood glucose is taken between 5-6 am. Her breakfast blood glucose range is generally 70-90 mg/dl. Her dinner blood glucose is taken between 6-7 pm. Her dinner blood glucose range is generally  mg/dl. Her highest blood  glucose is  mg/dl. Her overall blood glucose range is 70-90 mg/dl. She does not see a podiatrist.Eye exam is current.     Encounter Diagnoses   Name Primary?   • Controlled type 2 diabetes mellitus with stage 3 chronic kidney disease, with long-term current use of insulin Yes   • Morbid obesity    • Benign hypertension    • Dyslipidemia      62-year-old female patient here today for routine follow-up visit.  She has been seen for the above-mentioned diagnoses.  She had recent labs which were reviewed and she was provided a copy.  She is complaining of weight gain.  She states she does feel better since her hemoglobin A1c is under better control.  She states she is taking her medications as prescribed.  She did have a low blood sugar in the 60 range and has decreased her insulin as a result.  Currently her blood sugars are an satisfactory range.  Her meter download was reviewed.  She is had no reason which to go to the emergency room.  She is not sure if she needs refills on her medications at this visit.    The following portions of the patient's history were reviewed and updated as appropriate: allergies, current medications, past family history, past medical history, past social history, past surgical history and problem list.    Review of Systems   Constitutional: Negative for fatigue and weight loss.   HENT: Negative for trouble swallowing.    Eyes: Negative for blurred vision and visual disturbance.   Respiratory: Negative for shortness of breath.    Cardiovascular: Negative for chest pain and leg swelling.   Endocrine: Negative for polydipsia, polyphagia and polyuria.   Genitourinary: Negative for impotence.   Skin: Negative for pallor and wound.   Neurological: Negative for dizziness, tremors, seizures, speech difficulty, weakness, numbness and headaches.   Psychiatric/Behavioral: Negative for confusion. The patient is not nervous/anxious.        Objective   Physical Exam   Constitutional: She is  oriented to person, place, and time. She appears well-developed and well-nourished. No distress.   HENT:   Head: Normocephalic and atraumatic.   Right Ear: External ear normal.   Left Ear: External ear normal.   Nose: Nose normal.   Mouth/Throat: Oropharynx is clear and moist. No oropharyngeal exudate.   Eyes: EOM are normal. Pupils are equal, round, and reactive to light. Right eye exhibits no discharge. Left eye exhibits no discharge.   Neck: Trachea normal, normal range of motion and full passive range of motion without pain. Neck supple. No tracheal tenderness present. Carotid bruit is not present. No tracheal deviation, no edema and no erythema present. No thyroid mass and no thyromegaly present.   Cardiovascular: Normal rate, regular rhythm, normal heart sounds and intact distal pulses.  Exam reveals no gallop and no friction rub.    No murmur heard.  Pulmonary/Chest: Effort normal and breath sounds normal. No stridor. No respiratory distress. She has no wheezes. She has no rales.   Abdominal: Soft. Bowel sounds are normal. She exhibits no distension.   Musculoskeletal: Normal range of motion. She exhibits no edema or deformity.   Lymphadenopathy:     She has no cervical adenopathy.   Neurological: She is alert and oriented to person, place, and time.   Skin: Skin is warm and dry. No rash noted. She is not diaphoretic. No erythema. No pallor.   Acanthosis nigricans   Psychiatric: She has a normal mood and affect. Her behavior is normal. Judgment and thought content normal.   Nursing note and vitals reviewed.    Results for orders placed or performed in visit on 12/15/17   Comprehensive Metabolic Panel   Result Value Ref Range    Glucose 101 (H) 65 - 99 mg/dL    BUN 20 8 - 23 mg/dL    Creatinine 1.15 (H) 0.57 - 1.00 mg/dL    eGFR Non African Am 48 (L) >60 mL/min/1.73    eGFR African Am 58 (L) >60 mL/min/1.73    BUN/Creatinine Ratio 17.4 7.0 - 25.0    Sodium 143 136 - 145 mmol/L    Potassium 4.3 3.5 - 5.2 mmol/L     Chloride 101 98 - 107 mmol/L    Total CO2 30.5 (H) 22.0 - 29.0 mmol/L    Calcium 8.9 8.6 - 10.5 mg/dL    Total Protein 7.5 6.0 - 8.5 g/dL    Albumin 4.20 3.50 - 5.20 g/dL    Globulin 3.3 gm/dL    A/G Ratio 1.3 g/dL    Total Bilirubin 0.2 0.1 - 1.2 mg/dL    Alkaline Phosphatase 89 39 - 117 U/L    AST (SGOT) 10 1 - 32 U/L    ALT (SGPT) 16 1 - 33 U/L   C-Peptide   Result Value Ref Range    C-Peptide 2.0 1.1 - 4.4 ng/mL   Hemoglobin A1c   Result Value Ref Range    Hemoglobin A1C 6.86 (H) 4.80 - 5.60 %   Lipid Panel   Result Value Ref Range    Total Cholesterol 129 0 - 200 mg/dL    Triglycerides 83 0 - 150 mg/dL    HDL Cholesterol 51 40 - 60 mg/dL    VLDL Cholesterol 16.6 5 - 40 mg/dL    LDL Cholesterol  61 0 - 100 mg/dL   Vitamin D 25 Hydroxy   Result Value Ref Range    25 Hydroxy, Vitamin D 39.0 30.0 - 100.0 ng/mL   MicroAlbumin, Urine, Random   Result Value Ref Range    Microalbumin, Urine 198.0 Not Estab. ug/mL   Cardiovascular Risk Assessment   Result Value Ref Range    Interpretation Note    Diabetes Patient Education   Result Value Ref Range    PDF Image Not applicable          Assessment/Plan   Problems Addressed this Visit        Cardiovascular and Mediastinum    Benign hypertension       Digestive    Morbid obesity       Endocrine    Controlled type 2 diabetes mellitus with stage 3 chronic kidney disease, with long-term current use of insulin - Primary       Other    Dyslipidemia      In summary, patient was seen and examined she will continue all her current medications as prescribed.  We did discuss decreasing insulin based on any future low blood sugars.  She does need to watch her diet and exercise more.  She has some knee issues however we discussed water aerobics for resistance for exercise.  Metabolically she is stable and her hemoglobin A1c has improved significantly.  She will follow-up with Dr. Carrillo her next visit 4-6 months.  I've encouraged her to contact the office should she have any questions  or concerns prior to then    Diet and exercise  Continue all current meds

## 2018-06-06 RX ORDER — EXENATIDE 2 MG/.65ML
INJECTION, SUSPENSION, EXTENDED RELEASE SUBCUTANEOUS
Qty: 12 PEN | Refills: 0 | Status: SHIPPED | OUTPATIENT
Start: 2018-06-06 | End: 2018-08-29 | Stop reason: SDUPTHER

## 2018-06-15 DIAGNOSIS — R60.9 EDEMA, UNSPECIFIED TYPE: ICD-10-CM

## 2018-06-15 DIAGNOSIS — I10 BENIGN HYPERTENSION: ICD-10-CM

## 2018-06-15 RX ORDER — TRIAMTERENE AND HYDROCHLOROTHIAZIDE 37.5; 25 MG/1; MG/1
1 TABLET ORAL DAILY
Qty: 90 TABLET | Refills: 3 | Status: SHIPPED | OUTPATIENT
Start: 2018-06-15 | End: 2019-06-10 | Stop reason: SDUPTHER

## 2018-06-20 DIAGNOSIS — E66.01 MORBID OBESITY DUE TO EXCESS CALORIES (HCC): ICD-10-CM

## 2018-06-20 DIAGNOSIS — I10 ESSENTIAL HYPERTENSION: ICD-10-CM

## 2018-06-21 RX ORDER — AMLODIPINE BESYLATE 10 MG/1
TABLET ORAL
Qty: 90 TABLET | Refills: 3 | Status: SHIPPED | OUTPATIENT
Start: 2018-06-21 | End: 2019-07-30 | Stop reason: SDUPTHER

## 2018-06-26 ENCOUNTER — OFFICE VISIT (OUTPATIENT)
Dept: FAMILY MEDICINE CLINIC | Facility: CLINIC | Age: 63
End: 2018-06-26

## 2018-06-26 VITALS
HEART RATE: 76 BPM | BODY MASS INDEX: 48.82 KG/M2 | HEIGHT: 65 IN | SYSTOLIC BLOOD PRESSURE: 122 MMHG | DIASTOLIC BLOOD PRESSURE: 80 MMHG | WEIGHT: 293 LBS | TEMPERATURE: 99 F | OXYGEN SATURATION: 98 %

## 2018-06-26 DIAGNOSIS — Z79.4 CONTROLLED TYPE 2 DIABETES MELLITUS WITH STAGE 3 CHRONIC KIDNEY DISEASE, WITH LONG-TERM CURRENT USE OF INSULIN (HCC): ICD-10-CM

## 2018-06-26 DIAGNOSIS — I10 BENIGN HYPERTENSION: ICD-10-CM

## 2018-06-26 DIAGNOSIS — L73.2 HYDRADENITIS: Primary | ICD-10-CM

## 2018-06-26 DIAGNOSIS — N18.30 CONTROLLED TYPE 2 DIABETES MELLITUS WITH STAGE 3 CHRONIC KIDNEY DISEASE, WITH LONG-TERM CURRENT USE OF INSULIN (HCC): ICD-10-CM

## 2018-06-26 DIAGNOSIS — E11.22 CONTROLLED TYPE 2 DIABETES MELLITUS WITH STAGE 3 CHRONIC KIDNEY DISEASE, WITH LONG-TERM CURRENT USE OF INSULIN (HCC): ICD-10-CM

## 2018-06-26 LAB — HBA1C MFR BLD: 7 %

## 2018-06-26 PROCEDURE — 83036 HEMOGLOBIN GLYCOSYLATED A1C: CPT | Performed by: FAMILY MEDICINE

## 2018-06-26 PROCEDURE — 99214 OFFICE O/P EST MOD 30 MIN: CPT | Performed by: FAMILY MEDICINE

## 2018-06-26 RX ORDER — CLINDAMYCIN PHOSPHATE 10 UG/ML
LOTION TOPICAL EVERY 12 HOURS SCHEDULED
Qty: 60 ML | Refills: 2 | Status: SHIPPED | OUTPATIENT
Start: 2018-06-26 | End: 2018-06-26 | Stop reason: SDUPTHER

## 2018-06-26 RX ORDER — CLINDAMYCIN PHOSPHATE 10 UG/ML
LOTION TOPICAL EVERY 12 HOURS SCHEDULED
Qty: 60 ML | Refills: 2 | Status: SHIPPED | OUTPATIENT
Start: 2018-06-26 | End: 2019-01-18

## 2018-06-26 NOTE — PROGRESS NOTES
Ria Chan is a 62 y.o. female.     Chief Complaint   Patient presents with   • Diabetes Mellitus     pt feels pain in feet some days   • Hypertension     follow up no complains   • Breast Mass     left breast lump for about a month would like dr graham solano looks        HPI     Pt is a pleasant 62 y.o. YO female here for HTN and a L breast mass.  PMH includes DM well controlled managed by endo, HTN, HLD, and obesity.      New problem of a breast mass on the R side for the past month.  It appeared as a knot and then became painful and became a lump.  She now has a secretions That have started, white drainage.  She is up-to-date on mammograms, she has had them annually.  No personal or family history of breast cancer.    Hypertension well controlled, adherent with medications.  She does not check it at home.  She has no symptoms.  GFR 48% in 12/2017.    The following portions of the patient's history were reviewed and updated as appropriate: allergies, current medications, past family history, past medical history, past social history, past surgical history and problem list.    Review of Systems   Musculoskeletal: Positive for arthralgias, back pain and myalgias.        Feet   Neurological: Negative.  Dizziness: endo.   All other systems reviewed and are negative.      Objective  Vitals:    06/26/18 1533   BP: 122/80   Pulse: 76   Temp: 99 °F (37.2 °C)   SpO2: 98%        Physical Exam   Constitutional: She is oriented to person, place, and time. She appears well-developed and well-nourished. No distress.   HENT:   Head: Normocephalic.   Nose: Nose normal.   Eyes: EOM are normal.   Cardiovascular: Normal rate, regular rhythm, normal heart sounds and intact distal pulses.    No murmur heard.  Pulmonary/Chest: Effort normal and breath sounds normal. No respiratory distress.   Genitourinary:   Genitourinary Comments: Breast with a 2 cm cyst at the inferior border along the bra line.  It has opened with purulent  drainage.,  Redness has improved, mildly tender.   Musculoskeletal: Normal range of motion.   Neurological: She is alert and oriented to person, place, and time.   Skin: Skin is warm and dry. No rash noted.   Psychiatric: She has a normal mood and affect. Her behavior is normal. Judgment and thought content normal.   Nursing note and vitals reviewed.        Current Outpatient Prescriptions:   •  amLODIPine (NORVASC) 10 MG tablet, TAKE 1 TABLET DAILY, Disp: 90 tablet, Rfl: 3  •  aspirin 81 MG tablet, Take 81 mg by mouth daily., Disp: , Rfl:   •  BD PEN NEEDLE SERVANDO U/F 32G X 4 MM misc, 4 (four) times a day., Disp: , Rfl:   •  BYDUREON 2 MG pen-injector injection, INJECT 2 MG UNDER THE SKIN ONE TIME PER WEEK, Disp: 12 pen, Rfl: 0  •  Insulin Glargine (TOUJEO SOLOSTAR) 300 UNIT/ML solution pen-injector, Inject 225 Units under the skin Daily. (Patient taking differently: Inject 160 Units under the skin Daily.), Disp: 45 pen, Rfl: 0  •  losartan (COZAAR) 25 MG tablet, Take 1 tablet by mouth Daily., Disp: 90 tablet, Rfl: 4  •  Multiple Vitamins-Minerals (MULTIVITAMIN ADULT PO), Take 1 tablet by mouth daily., Disp: , Rfl:   •  ONE TOUCH ULTRA TEST test strip, 1 each by Other route 3 (three) times a day. Use as instructed, Disp: , Rfl:   •  rosuvastatin (CRESTOR) 20 MG tablet, Take 1 tablet by mouth Daily., Disp: 90 tablet, Rfl: 4  •  triamterene-hydrochlorothiazide (MAXZIDE-25) 37.5-25 MG per tablet, Take 1 tablet by mouth Daily., Disp: 90 tablet, Rfl: 3  •  clindamycin (CLEOCIN T) 1 % lotion, Apply  topically Every 12 (Twelve) Hours., Disp: 60 mL, Rfl: 2    Procedures    Lab Results (most recent)     None        Office Visit on 06/26/2018   Component Date Value Ref Range Status   • Hemoglobin A1C 06/26/2018 7  % Jesenia Rollins was seen today for diabetes mellitus, hypertension and breast mass.    Diagnoses and all orders for this visit:    Hydradenitis  -     Discontinue: clindamycin (CLEOCIN T) 1 % lotion; Apply   topically Every 12 (Twelve) Hours.  -     clindamycin (CLEOCIN T) 1 % lotion; Apply  topically Every 12 (Twelve) Hours.    Controlled type 2 diabetes mellitus with stage 3 chronic kidney disease, with long-term current use of insulin  -     POC Glycosylated Hemoglobin (Hb A1C)    Benign hypertension      Breast mass is not consistent with malignancy, hidradenitis.  Reassure patient.  Topical Clinda as it is resolving and almost healed.  If it recurs or doesn't improve, refer to surgery for excision.    Type 2 diabetes well controlled, follow-up with endocrinology.  Hemoglobin A1c was performed prior to realizing that they were managing her care.    Hypertension well controlled on current meds.  No changes.  Follow-up in 6 months.    Return in about 6 months (around 12/26/2018), or if symptoms worsen or fail to improve, for Recheck HTN .      Sharonda Rodgers MD

## 2018-06-30 LAB
25(OH)D3+25(OH)D2 SERPL-MCNC: 44.4 NG/ML (ref 30–100)
ALBUMIN SERPL-MCNC: 4.4 G/DL (ref 3.5–5.2)
ALBUMIN/GLOB SERPL: 1.6 G/DL
ALP SERPL-CCNC: 79 U/L (ref 39–117)
ALT SERPL-CCNC: 17 U/L (ref 1–33)
AST SERPL-CCNC: 19 U/L (ref 1–32)
BILIRUB SERPL-MCNC: 0.2 MG/DL (ref 0.1–1.2)
BUN SERPL-MCNC: 17 MG/DL (ref 8–23)
BUN/CREAT SERPL: 16.8 (ref 7–25)
C PEPTIDE SERPL-MCNC: 3.2 NG/ML (ref 1.1–4.4)
CALCIUM SERPL-MCNC: 10.1 MG/DL (ref 8.6–10.5)
CHLORIDE SERPL-SCNC: 99 MMOL/L (ref 98–107)
CHOLEST SERPL-MCNC: 119 MG/DL (ref 0–200)
CO2 SERPL-SCNC: 25.5 MMOL/L (ref 22–29)
CREAT SERPL-MCNC: 1.01 MG/DL (ref 0.57–1)
GLOBULIN SER CALC-MCNC: 2.8 GM/DL
GLUCOSE SERPL-MCNC: 129 MG/DL (ref 65–99)
HBA1C MFR BLD: 6.8 % (ref 4.8–5.6)
HDLC SERPL-MCNC: 50 MG/DL (ref 40–60)
INTERPRETATION: NORMAL
LDLC SERPL CALC-MCNC: 56 MG/DL (ref 0–100)
Lab: NORMAL
MICROALBUMIN UR-MCNC: 159.1 UG/ML
POTASSIUM SERPL-SCNC: 4.5 MMOL/L (ref 3.5–5.2)
PROT SERPL-MCNC: 7.2 G/DL (ref 6–8.5)
SODIUM SERPL-SCNC: 141 MMOL/L (ref 136–145)
T3FREE SERPL-MCNC: 2.7 PG/ML (ref 2–4.4)
T4 FREE SERPL-MCNC: 0.98 NG/DL (ref 0.93–1.7)
T4 SERPL-MCNC: 6.4 MCG/DL (ref 4.5–11.7)
TRIGL SERPL-MCNC: 63 MG/DL (ref 0–150)
TSH SERPL DL<=0.005 MIU/L-ACNC: 2.07 MIU/ML (ref 0.27–4.2)
URATE SERPL-MCNC: 7 MG/DL (ref 2.4–5.7)
VLDLC SERPL CALC-MCNC: 12.6 MG/DL (ref 5–40)

## 2018-07-10 RX ORDER — INSULIN GLARGINE 300 U/ML
225 INJECTION, SOLUTION SUBCUTANEOUS DAILY
Qty: 67.5 ML | Refills: 0 | Status: SHIPPED | OUTPATIENT
Start: 2018-07-10 | End: 2018-10-08 | Stop reason: SDUPTHER

## 2018-07-10 RX ORDER — DAPAGLIFLOZIN AND METFORMIN HYDROCHLORIDE 5; 1000 MG/1; MG/1
TABLET, FILM COATED, EXTENDED RELEASE ORAL
Qty: 180 TABLET | Refills: 0 | Status: SHIPPED | OUTPATIENT
Start: 2018-07-10 | End: 2018-10-08 | Stop reason: SDUPTHER

## 2018-07-13 ENCOUNTER — OFFICE VISIT (OUTPATIENT)
Dept: ENDOCRINOLOGY | Age: 63
End: 2018-07-13

## 2018-07-13 VITALS
BODY MASS INDEX: 48.82 KG/M2 | WEIGHT: 293 LBS | RESPIRATION RATE: 16 BRPM | SYSTOLIC BLOOD PRESSURE: 128 MMHG | HEIGHT: 65 IN | DIASTOLIC BLOOD PRESSURE: 72 MMHG

## 2018-07-13 DIAGNOSIS — Z79.4 CONTROLLED TYPE 2 DIABETES MELLITUS WITH STAGE 3 CHRONIC KIDNEY DISEASE, WITH LONG-TERM CURRENT USE OF INSULIN (HCC): Primary | ICD-10-CM

## 2018-07-13 DIAGNOSIS — N18.30 CONTROLLED TYPE 2 DIABETES MELLITUS WITH STAGE 3 CHRONIC KIDNEY DISEASE, WITH LONG-TERM CURRENT USE OF INSULIN (HCC): Primary | ICD-10-CM

## 2018-07-13 DIAGNOSIS — E11.22 CONTROLLED TYPE 2 DIABETES MELLITUS WITH STAGE 3 CHRONIC KIDNEY DISEASE, WITH LONG-TERM CURRENT USE OF INSULIN (HCC): Primary | ICD-10-CM

## 2018-07-13 DIAGNOSIS — E78.5 DYSLIPIDEMIA: ICD-10-CM

## 2018-07-13 DIAGNOSIS — I10 BENIGN HYPERTENSION: ICD-10-CM

## 2018-07-13 DIAGNOSIS — E66.01 MORBID OBESITY (HCC): ICD-10-CM

## 2018-07-13 PROCEDURE — 99214 OFFICE O/P EST MOD 30 MIN: CPT | Performed by: INTERNAL MEDICINE

## 2018-07-13 NOTE — PROGRESS NOTES
"Subjective   Ria Chan is a 62 y.o. female seen for follow up for Dm2, hyperlipidemia, HTN, lab review. Patient denies any problems or concerns. Patient is checking BG once a day. No BG readings.   History of Present Illness this is a 62-year-old female known patient with type II diabetes hypertension and dyslipidemia as well as vitamin D deficiency.  Over the course of last 6 months she has had no significant health problems for which to go to the emergency room or hospital.  /72   Resp 16   Ht 165.1 cm (65\")   Wt (!) 139 kg (306 lb)   BMI 50.92 kg/m²   Allergies   Allergen Reactions   • Aliskiren Other (See Comments)     Causes patient to go into a coma   • Ace Inhibitors Angioedema   • Diclofenac Angioedema   • Sulfamethoxazole-Trimethoprim Nausea Only       Current Outpatient Prescriptions:   •  amLODIPine (NORVASC) 10 MG tablet, TAKE 1 TABLET DAILY, Disp: 90 tablet, Rfl: 3  •  aspirin 81 MG tablet, Take 81 mg by mouth daily., Disp: , Rfl:   •  BD PEN NEEDLE SERVANDO U/F 32G X 4 MM misc, 4 (four) times a day., Disp: , Rfl:   •  BYDUREON 2 MG pen-injector injection, INJECT 2 MG UNDER THE SKIN ONE TIME PER WEEK, Disp: 12 pen, Rfl: 0  •  clindamycin (CLEOCIN T) 1 % lotion, Apply  topically Every 12 (Twelve) Hours., Disp: 60 mL, Rfl: 2  •  Insulin Glargine (TOUJEO SOLOSTAR) 300 UNIT/ML solution pen-injector, Inject 225 Units under the skin Daily. (Patient taking differently: Inject 160 Units under the skin Daily.), Disp: 45 pen, Rfl: 0  •  losartan (COZAAR) 25 MG tablet, Take 1 tablet by mouth Daily., Disp: 90 tablet, Rfl: 4  •  Multiple Vitamins-Minerals (MULTIVITAMIN ADULT PO), Take 1 tablet by mouth daily., Disp: , Rfl:   •  ONE TOUCH ULTRA TEST test strip, 1 each by Other route 3 (three) times a day. Use as instructed, Disp: , Rfl:   •  rosuvastatin (CRESTOR) 20 MG tablet, Take 1 tablet by mouth Daily., Disp: 90 tablet, Rfl: 4  •  TOUJEO SOLOSTAR 300 UNIT/ML solution pen-injector, INJECT 225 UNITS " UNDER THE SKIN DAILY, Disp: 67.5 mL, Rfl: 0  •  triamterene-hydrochlorothiazide (MAXZIDE-25) 37.5-25 MG per tablet, Take 1 tablet by mouth Daily., Disp: 90 tablet, Rfl: 3  •  XIGDUO XR 5-1000 MG tablet, TAKE 2 TABLETS DAILY, Disp: 180 tablet, Rfl: 0      The following portions of the patient's history were reviewed and updated as appropriate: allergies, current medications, past family history, past medical history, past social history, past surgical history and problem list.    Review of Systems   Constitutional: Negative.  Negative for fatigue.   HENT: Negative.    Eyes: Negative.    Respiratory: Negative.    Cardiovascular: Negative.  Negative for chest pain.   Gastrointestinal: Negative.    Endocrine: Negative.  Negative for polydipsia, polyphagia and polyuria.   Genitourinary: Negative.    Musculoskeletal: Negative.    Skin: Negative.  Negative for pallor.   Allergic/Immunologic: Negative.    Neurological: Negative.  Negative for dizziness, tremors, seizures, speech difficulty, weakness and headaches.   Hematological: Negative.    Psychiatric/Behavioral: Negative.  Negative for confusion. The patient is not nervous/anxious.        Objective   Physical Exam   Constitutional: She is oriented to person, place, and time. She appears well-developed and well-nourished. No distress.   Morbid obesity   HENT:   Head: Normocephalic and atraumatic.   Right Ear: External ear normal.   Left Ear: External ear normal.   Nose: Nose normal.   Mouth/Throat: Oropharynx is clear and moist. No oropharyngeal exudate.   Eyes: Conjunctivae and EOM are normal. Pupils are equal, round, and reactive to light. Right eye exhibits no discharge. Left eye exhibits no discharge. No scleral icterus.   Neck: Normal range of motion. Neck supple. No JVD present. No tracheal deviation present. No thyromegaly present.   Cardiovascular: Normal rate, regular rhythm, normal heart sounds and intact distal pulses.  Exam reveals no gallop and no friction  rub.    No murmur heard.  Pulmonary/Chest: Effort normal and breath sounds normal. No stridor. No respiratory distress. She has no wheezes. She has no rales. She exhibits no tenderness.   Abdominal: Soft. Bowel sounds are normal. She exhibits no distension and no mass. There is no tenderness. There is no rebound and no guarding. No hernia.   Musculoskeletal: Normal range of motion. She exhibits no edema, tenderness or deformity.   Lymphadenopathy:     She has no cervical adenopathy.   Neurological: She is alert and oriented to person, place, and time. She has normal reflexes. She displays normal reflexes. No cranial nerve deficit or sensory deficit. She exhibits normal muscle tone. Coordination normal.   Skin: Skin is warm and dry. No rash noted. She is not diaphoretic. No erythema. No pallor.   Acanthosis nigricans around her neck   Psychiatric: She has a normal mood and affect. Her behavior is normal. Judgment and thought content normal.   Nursing note and vitals reviewed.    Results for orders placed or performed in visit on 06/26/18   POC Glycosylated Hemoglobin (Hb A1C)   Result Value Ref Range    Hemoglobin A1C 7 %     Lab Results   Component Value Date    GLUCOSE 277 (H) 06/07/2016    BUN 17 06/29/2018    CREATININE 1.01 (H) 06/29/2018    EGFRIFNONA 56 (L) 06/29/2018    EGFRIFAFRI 68 06/29/2018    BCR 16.8 06/29/2018    K 4.5 06/29/2018    CO2 25.5 06/29/2018    CALCIUM 10.1 06/29/2018    PROTENTOTREF 7.2 06/29/2018    ALBUMIN 4.40 06/29/2018    LABIL2 1.6 06/29/2018    AST 19 06/29/2018    ALT 17 06/29/2018     Lab Results   Component Value Date    TSH 2.070 06/29/2018     Lab Results   Component Value Date    CHLPL 119 06/29/2018    CHLPL 129 12/15/2017    CHLPL 140 07/13/2017     Lab Results   Component Value Date    TRIG 63 06/29/2018    TRIG 83 12/15/2017    TRIG 82 07/13/2017     Lab Results   Component Value Date    HDL 50 06/29/2018    HDL 51 12/15/2017    HDL 55 07/13/2017     Lab Results   Component  Value Date    LDL 56 06/29/2018    LDL 61 12/15/2017    LDL 69 07/13/2017         Assessment/Plan   Diagnoses and all orders for this visit:    Controlled type 2 diabetes mellitus with stage 3 chronic kidney disease, with long-term current use of insulin (CMS/[facility])  -     T4 & TSH ([facility]); Future  -     Uric Acid; Future  -     Vitamin D 25 Hydroxy; Future  -     Comprehensive Metabolic Panel; Future  -     C-Peptide; Future  -     Hemoglobin A1c; Future  -     Lipid Panel; Future  -     MicroAlbumin, Urine, Random - Urine, Clean Catch; Future    Benign hypertension  -     T4 & TSH ([facility]); Future  -     Uric Acid; Future  -     Vitamin D 25 Hydroxy; Future  -     Comprehensive Metabolic Panel; Future  -     C-Peptide; Future  -     Hemoglobin A1c; Future  -     Lipid Panel; Future  -     MicroAlbumin, Urine, Random - Urine, Clean Catch; Future    Morbid obesity (CMS/[facility])  -     T4 & TSH ([facility]); Future  -     Uric Acid; Future  -     Vitamin D 25 Hydroxy; Future  -     Comprehensive Metabolic Panel; Future  -     C-Peptide; Future  -     Hemoglobin A1c; Future  -     Lipid Panel; Future  -     MicroAlbumin, Urine, Random - Urine, Clean Catch; Future    Dyslipidemia  -     T4 & TSH ([facility]); Future  -     Uric Acid; Future  -     Vitamin D 25 Hydroxy; Future  -     Comprehensive Metabolic Panel; Future  -     C-Peptide; Future  -     Hemoglobin A1c; Future  -     Lipid Panel; Future  -     MicroAlbumin, Urine, Random - Urine, Clean Catch; Future             his summary I saw and examined this 62-year-old female for above-mentioned problems.  I reviewed her laboratory evaluation of 06/29/2018 and provided her with a hard copy of it.  Overall she is clinically and metabolically stable and therefore we will go ahead and continue all her current prescriptions.  She will see Ms. Frances Ball in 6 months or sooner if needed with laboratory evaluation prior to each office visit.

## 2018-08-20 DIAGNOSIS — E66.01 MORBID OBESITY DUE TO EXCESS CALORIES (HCC): ICD-10-CM

## 2018-08-20 DIAGNOSIS — E11.65 UNCONTROLLED TYPE 2 DIABETES MELLITUS WITH OTHER SPECIFIED COMPLICATION, UNSPECIFIED LONG TERM INSULIN USE STATUS: ICD-10-CM

## 2018-08-20 DIAGNOSIS — E11.69 UNCONTROLLED TYPE 2 DIABETES MELLITUS WITH OTHER SPECIFIED COMPLICATION, UNSPECIFIED LONG TERM INSULIN USE STATUS: ICD-10-CM

## 2018-08-20 RX ORDER — BLOOD-GLUCOSE METER
EACH MISCELLANEOUS
Qty: 1 EACH | Refills: 1 | Status: SHIPPED | OUTPATIENT
Start: 2018-08-20 | End: 2019-01-18

## 2018-08-20 RX ORDER — LANCETS 33 GAUGE
EACH MISCELLANEOUS
Qty: 300 EACH | Refills: 1 | Status: SHIPPED | OUTPATIENT
Start: 2018-08-20 | End: 2018-08-31 | Stop reason: SDUPTHER

## 2018-08-20 RX ORDER — BLOOD-GLUCOSE CONTROL, NORMAL
EACH MISCELLANEOUS
Qty: 1 EACH | Refills: 1 | Status: SHIPPED | OUTPATIENT
Start: 2018-08-20 | End: 2018-08-31 | Stop reason: SDUPTHER

## 2018-08-29 RX ORDER — EXENATIDE 2 MG/.65ML
INJECTION, SUSPENSION, EXTENDED RELEASE SUBCUTANEOUS
Qty: 12 PEN | Refills: 0 | Status: SHIPPED | OUTPATIENT
Start: 2018-08-29 | End: 2018-11-21 | Stop reason: SDUPTHER

## 2018-08-31 DIAGNOSIS — E11.65 UNCONTROLLED TYPE 2 DIABETES MELLITUS WITH OTHER SPECIFIED COMPLICATION, UNSPECIFIED LONG TERM INSULIN USE STATUS: ICD-10-CM

## 2018-08-31 DIAGNOSIS — E66.01 MORBID OBESITY DUE TO EXCESS CALORIES (HCC): ICD-10-CM

## 2018-08-31 DIAGNOSIS — E11.69 UNCONTROLLED TYPE 2 DIABETES MELLITUS WITH OTHER SPECIFIED COMPLICATION, UNSPECIFIED LONG TERM INSULIN USE STATUS: ICD-10-CM

## 2018-08-31 RX ORDER — BLOOD-GLUCOSE CONTROL, NORMAL
EACH MISCELLANEOUS
Qty: 1 EACH | Refills: 1 | Status: SHIPPED | OUTPATIENT
Start: 2018-08-31 | End: 2019-01-18

## 2018-08-31 RX ORDER — LANCETS 33 GAUGE
EACH MISCELLANEOUS
Qty: 300 EACH | Refills: 1 | Status: SHIPPED | OUTPATIENT
Start: 2018-08-31 | End: 2019-01-18

## 2018-10-08 RX ORDER — INSULIN GLARGINE 300 U/ML
225 INJECTION, SOLUTION SUBCUTANEOUS DAILY
Qty: 67.5 ML | Refills: 0 | Status: SHIPPED | OUTPATIENT
Start: 2018-10-08 | End: 2019-01-06 | Stop reason: SDUPTHER

## 2018-10-08 RX ORDER — DAPAGLIFLOZIN AND METFORMIN HYDROCHLORIDE 5; 1000 MG/1; MG/1
TABLET, FILM COATED, EXTENDED RELEASE ORAL
Qty: 180 TABLET | Refills: 0 | Status: SHIPPED | OUTPATIENT
Start: 2018-10-08 | End: 2019-01-06 | Stop reason: SDUPTHER

## 2018-11-21 RX ORDER — EXENATIDE 2 MG/.65ML
INJECTION, SUSPENSION, EXTENDED RELEASE SUBCUTANEOUS
Qty: 12 PEN | Refills: 0 | Status: SHIPPED | OUTPATIENT
Start: 2018-11-21 | End: 2019-02-13 | Stop reason: SDUPTHER

## 2018-12-10 RX ORDER — IRBESARTAN 75 MG/1
75 TABLET ORAL DAILY
Qty: 30 TABLET | Refills: 11 | Status: SHIPPED | OUTPATIENT
Start: 2018-12-10 | End: 2019-01-04 | Stop reason: SDUPTHER

## 2018-12-12 ENCOUNTER — TELEPHONE (OUTPATIENT)
Dept: ENDOCRINOLOGY | Age: 63
End: 2018-12-12

## 2018-12-12 NOTE — TELEPHONE ENCOUNTER
----- Message from Jesus Carrillo MD sent at 12/12/2018  9:29 AM EST -----  Yes and let us know in about a week or so  ----- Message -----  From: Fabiola Kline MA  Sent: 12/11/2018   4:02 PM  To: Jesus Carrillo MD    Does she just discontinue Losartan?  ----- Message -----  From: Taniya Dennis MA  Sent: 12/10/2018   5:22 PM  To: Fabiola Kline MA        ----- Message -----  From: Jesus Carrillo MD  Sent: 12/10/2018   4:33 PM  To: Taniya Denins MA    The only one that I have  heard and read about is losartan and I am switching it to Avapro 75 mg daily.  ----- Message -----  From: Taniya Dennis MA  Sent: 12/10/2018   4:06 PM  To: Jesus Carrillo MD        ----- Message -----  From: Ramandeep Garcia  Sent: 12/10/2018   9:33 AM  To: Fabiola Kline MA    RECALL ON AMLODIPINE  AND HYDROCHLOROTHIAVIVE  (HCTZ) AND LOSARTAN AND TRIAMPERENE       PATIENT IS TAKING ALL OF THESE NOW    SHE SAW ON THE NEWS AND SHE RESEARCHED IN     RECALLED NOV 13,2018    PATIENT NEEDS TO KNOW WHAT TO DO     PATIENT HAS NOT STOP TAKING IT          Left patient a detailed voicemail in regards to medication changes and asked patient to call our office in one week to let us know how she is doing on the new medication.

## 2018-12-26 DIAGNOSIS — N18.30 CONTROLLED TYPE 2 DIABETES MELLITUS WITH STAGE 3 CHRONIC KIDNEY DISEASE, WITH LONG-TERM CURRENT USE OF INSULIN (HCC): Primary | ICD-10-CM

## 2018-12-26 DIAGNOSIS — E78.5 DYSLIPIDEMIA: ICD-10-CM

## 2018-12-26 DIAGNOSIS — E11.22 CONTROLLED TYPE 2 DIABETES MELLITUS WITH STAGE 3 CHRONIC KIDNEY DISEASE, WITH LONG-TERM CURRENT USE OF INSULIN (HCC): Primary | ICD-10-CM

## 2018-12-26 DIAGNOSIS — Z79.4 CONTROLLED TYPE 2 DIABETES MELLITUS WITH STAGE 3 CHRONIC KIDNEY DISEASE, WITH LONG-TERM CURRENT USE OF INSULIN (HCC): Primary | ICD-10-CM

## 2019-01-03 ENCOUNTER — APPOINTMENT (OUTPATIENT)
Dept: WOMENS IMAGING | Facility: HOSPITAL | Age: 64
End: 2019-01-03

## 2019-01-03 PROCEDURE — 77067 SCR MAMMO BI INCL CAD: CPT | Performed by: RADIOLOGY

## 2019-01-03 PROCEDURE — 77063 BREAST TOMOSYNTHESIS BI: CPT | Performed by: RADIOLOGY

## 2019-01-04 ENCOUNTER — LAB (OUTPATIENT)
Dept: ENDOCRINOLOGY | Age: 64
End: 2019-01-04

## 2019-01-04 ENCOUNTER — TELEPHONE (OUTPATIENT)
Dept: ENDOCRINOLOGY | Age: 64
End: 2019-01-04

## 2019-01-04 DIAGNOSIS — E78.5 DYSLIPIDEMIA: ICD-10-CM

## 2019-01-04 DIAGNOSIS — Z79.4 CONTROLLED TYPE 2 DIABETES MELLITUS WITH STAGE 3 CHRONIC KIDNEY DISEASE, WITH LONG-TERM CURRENT USE OF INSULIN (HCC): ICD-10-CM

## 2019-01-04 DIAGNOSIS — E11.22 CONTROLLED TYPE 2 DIABETES MELLITUS WITH STAGE 3 CHRONIC KIDNEY DISEASE, WITH LONG-TERM CURRENT USE OF INSULIN (HCC): ICD-10-CM

## 2019-01-04 DIAGNOSIS — N18.30 CONTROLLED TYPE 2 DIABETES MELLITUS WITH STAGE 3 CHRONIC KIDNEY DISEASE, WITH LONG-TERM CURRENT USE OF INSULIN (HCC): ICD-10-CM

## 2019-01-04 RX ORDER — IRBESARTAN 75 MG/1
75 TABLET ORAL DAILY
Qty: 90 TABLET | Refills: 0 | Status: SHIPPED | OUTPATIENT
Start: 2019-01-04 | End: 2019-01-08

## 2019-01-05 LAB
25(OH)D3+25(OH)D2 SERPL-MCNC: 41.1 NG/ML (ref 30–100)
ALBUMIN SERPL-MCNC: 4.2 G/DL (ref 3.5–5.2)
ALBUMIN/GLOB SERPL: 1.3 G/DL
ALP SERPL-CCNC: 73 U/L (ref 39–117)
ALT SERPL-CCNC: 14 U/L (ref 1–33)
AST SERPL-CCNC: 14 U/L (ref 1–32)
BILIRUB SERPL-MCNC: 0.3 MG/DL (ref 0.1–1.2)
BUN SERPL-MCNC: 19 MG/DL (ref 8–23)
BUN/CREAT SERPL: 20.7 (ref 7–25)
C PEPTIDE SERPL-MCNC: 2.2 NG/ML (ref 1.1–4.4)
CALCIUM SERPL-MCNC: 9.9 MG/DL (ref 8.6–10.5)
CHLORIDE SERPL-SCNC: 100 MMOL/L (ref 98–107)
CHOLEST SERPL-MCNC: 135 MG/DL (ref 0–200)
CO2 SERPL-SCNC: 32.3 MMOL/L (ref 22–29)
CREAT SERPL-MCNC: 0.92 MG/DL (ref 0.57–1)
GLOBULIN SER CALC-MCNC: 3.2 GM/DL
GLUCOSE SERPL-MCNC: 154 MG/DL (ref 65–99)
HBA1C MFR BLD: 7.3 % (ref 4.8–5.6)
HDLC SERPL-MCNC: 49 MG/DL (ref 40–60)
INTERPRETATION: NORMAL
INTERPRETATION: NORMAL
LDLC SERPL CALC-MCNC: 74 MG/DL (ref 0–100)
Lab: NORMAL
Lab: NORMAL
POTASSIUM SERPL-SCNC: 3.9 MMOL/L (ref 3.5–5.2)
PROT SERPL-MCNC: 7.4 G/DL (ref 6–8.5)
SODIUM SERPL-SCNC: 142 MMOL/L (ref 136–145)
TRIGL SERPL-MCNC: 61 MG/DL (ref 0–150)
VLDLC SERPL CALC-MCNC: 12.2 MG/DL (ref 5–40)

## 2019-01-07 RX ORDER — INSULIN GLARGINE 300 U/ML
225 INJECTION, SOLUTION SUBCUTANEOUS DAILY
Qty: 67.5 ML | Refills: 0 | Status: SHIPPED | OUTPATIENT
Start: 2019-01-07 | End: 2019-01-18 | Stop reason: SDUPTHER

## 2019-01-07 RX ORDER — DAPAGLIFLOZIN AND METFORMIN HYDROCHLORIDE 5; 1000 MG/1; MG/1
TABLET, FILM COATED, EXTENDED RELEASE ORAL
Qty: 180 TABLET | Refills: 0 | Status: SHIPPED | OUTPATIENT
Start: 2019-01-07 | End: 2019-04-06 | Stop reason: SDUPTHER

## 2019-01-08 ENCOUNTER — OFFICE VISIT (OUTPATIENT)
Dept: FAMILY MEDICINE CLINIC | Facility: CLINIC | Age: 64
End: 2019-01-08

## 2019-01-08 VITALS
TEMPERATURE: 99 F | BODY MASS INDEX: 48.82 KG/M2 | DIASTOLIC BLOOD PRESSURE: 72 MMHG | OXYGEN SATURATION: 96 % | SYSTOLIC BLOOD PRESSURE: 134 MMHG | HEIGHT: 65 IN | WEIGHT: 293 LBS | HEART RATE: 83 BPM

## 2019-01-08 DIAGNOSIS — Z79.4 CONTROLLED TYPE 2 DIABETES MELLITUS WITH STAGE 3 CHRONIC KIDNEY DISEASE, WITH LONG-TERM CURRENT USE OF INSULIN (HCC): ICD-10-CM

## 2019-01-08 DIAGNOSIS — R05.9 COUGH: Primary | ICD-10-CM

## 2019-01-08 DIAGNOSIS — R00.2 PALPITATIONS: ICD-10-CM

## 2019-01-08 DIAGNOSIS — E11.22 CONTROLLED TYPE 2 DIABETES MELLITUS WITH STAGE 3 CHRONIC KIDNEY DISEASE, WITH LONG-TERM CURRENT USE OF INSULIN (HCC): ICD-10-CM

## 2019-01-08 DIAGNOSIS — I20.0 UNSTABLE ANGINA (HCC): ICD-10-CM

## 2019-01-08 DIAGNOSIS — N18.30 CONTROLLED TYPE 2 DIABETES MELLITUS WITH STAGE 3 CHRONIC KIDNEY DISEASE, WITH LONG-TERM CURRENT USE OF INSULIN (HCC): ICD-10-CM

## 2019-01-08 PROCEDURE — 71046 X-RAY EXAM CHEST 2 VIEWS: CPT | Performed by: FAMILY MEDICINE

## 2019-01-08 PROCEDURE — 93000 ELECTROCARDIOGRAM COMPLETE: CPT | Performed by: FAMILY MEDICINE

## 2019-01-08 PROCEDURE — 99215 OFFICE O/P EST HI 40 MIN: CPT | Performed by: FAMILY MEDICINE

## 2019-01-08 RX ORDER — FUROSEMIDE 20 MG/1
20 TABLET ORAL DAILY
Qty: 30 TABLET | Refills: 0 | Status: SHIPPED | OUTPATIENT
Start: 2019-01-08 | End: 2019-04-26 | Stop reason: SDUPTHER

## 2019-01-08 RX ORDER — IRBESARTAN 150 MG/1
75 TABLET ORAL
COMMUNITY
Start: 2019-01-02 | End: 2019-04-26 | Stop reason: SDUPTHER

## 2019-01-08 NOTE — PROGRESS NOTES
Ria Chan is a 63 y.o. female.     Chief Complaint   Patient presents with   • Shortness of Breath     Pt stated after being dx with bronchitis she has experienced SOB   • Fatigue       Shortness of Breath   This is a new problem. The current episode started more than 1 month ago. The problem occurs daily. The problem has been gradually worsening. The average episode lasts 10 minutes. Associated symptoms include chest pain, ear pain, rhinorrhea, sputum production and wheezing. Pertinent negatives include no abdominal pain, claudication, coryza, fever, headaches, hemoptysis, leg pain, leg swelling, neck pain, orthopnea, PND, rash, sore throat, swollen glands, syncope or vomiting. The symptoms are aggravated by exercise and any activity.   Fatigue   Associated symptoms include chest pain and fatigue. Pertinent negatives include no abdominal pain, fever, headaches, neck pain, rash, sore throat, swollen glands or vomiting.      No orthopnea, some palpitation, L arm pain and chest pain.  She had some heart palpitations without issues. Stress test records reviewed from 2/2016: some minimal ST changes in II,III, aVF, V3-6.       The following portions of the patient's history were reviewed and updated as appropriate: allergies, current medications, past family history, past medical history, past social history, past surgical history and problem list.    Review of Systems   Constitutional: Positive for fatigue. Negative for fever.   HENT: Positive for ear pain and rhinorrhea. Negative for sore throat.    Respiratory: Positive for sputum production, shortness of breath and wheezing. Negative for hemoptysis.    Cardiovascular: Positive for chest pain. Negative for orthopnea, claudication, leg swelling, syncope and PND.   Gastrointestinal: Negative for abdominal pain and vomiting.   Musculoskeletal: Negative for neck pain.   Skin: Negative for rash.   Neurological: Negative for headaches.   All other systems reviewed  and are negative.      Objective  Vitals:    01/08/19 1346   BP: 134/72   Pulse: 83   Temp: 99 °F (37.2 °C)   SpO2: 96%        Physical Exam   Constitutional: She is oriented to person, place, and time. She appears well-developed and well-nourished. No distress.   HENT:   Head: Normocephalic.   Nose: Nose normal.   Eyes: EOM are normal.   Cardiovascular: Normal rate, regular rhythm, normal heart sounds and intact distal pulses.   No murmur heard.  Pulmonary/Chest: Effort normal and breath sounds normal. No respiratory distress.   Normal Pulmonary sounds.    Musculoskeletal: Normal range of motion.   Neurological: She is alert and oriented to person, place, and time.   Skin: Skin is warm and dry. No rash noted.   Psychiatric: She has a normal mood and affect. Her behavior is normal. Judgment and thought content normal.   Nursing note and vitals reviewed.        Current Outpatient Medications:   •  albuterol (PROVENTIL HFA;VENTOLIN HFA) 108 (90 Base) MCG/ACT inhaler, Inhale 2 puffs 4 (Four) Times a Day., Disp: 6.7 g, Rfl: 0  •  amLODIPine (NORVASC) 10 MG tablet, TAKE 1 TABLET DAILY, Disp: 90 tablet, Rfl: 3  •  aspirin 81 MG tablet, Take 81 mg by mouth daily., Disp: , Rfl:   •  BD PEN NEEDLE SERVANDO U/F 32G X 4 MM misc, 4 (four) times a day., Disp: , Rfl:   •  Blood Glucose Calibration (OT ULTRA/FASTTK CNTRL SOLN) solution, Use as directed, Disp: 1 each, Rfl: 1  •  Blood Glucose Monitoring Suppl (ONE TOUCH ULTRA 2) w/Device kit, Use 3 times a day, Disp: 1 each, Rfl: 1  •  BYDUREON 2 MG pen-injector injection, INJECT 2 MG UNDER THE SKIN ONE TIME PER WEEK, Disp: 12 pen, Rfl: 0  •  clindamycin (CLEOCIN T) 1 % lotion, Apply  topically Every 12 (Twelve) Hours., Disp: 60 mL, Rfl: 2  •  furosemide (LASIX) 20 MG tablet, Take 1 tablet by mouth Daily., Disp: 30 tablet, Rfl: 0  •  Insulin Glargine (TOUJEO SOLOSTAR) 300 UNIT/ML solution pen-injector, Inject 225 Units under the skin Daily. (Patient taking differently: Inject 160 Units  under the skin Daily.), Disp: 45 pen, Rfl: 0  •  irbesartan (AVAPRO) 150 MG tablet, , Disp: , Rfl:   •  Multiple Vitamins-Minerals (MULTIVITAMIN ADULT PO), Take 1 tablet by mouth daily., Disp: , Rfl:   •  ONE TOUCH ULTRA TEST test strip, Use 3 times a day, Disp: 300 each, Rfl: 1  •  ONETOUCH DELICA LANCETS 33G misc, Use 3 times a day, Disp: 300 each, Rfl: 1  •  rosuvastatin (CRESTOR) 20 MG tablet, Take 1 tablet by mouth Daily., Disp: 90 tablet, Rfl: 4  •  TOUJEO SOLOSTAR 300 UNIT/ML solution pen-injector, INJECT 225 UNITS UNDER THE SKIN DAILY, Disp: 67.5 mL, Rfl: 0  •  triamterene-hydrochlorothiazide (MAXZIDE-25) 37.5-25 MG per tablet, Take 1 tablet by mouth Daily., Disp: 90 tablet, Rfl: 3  •  XIGDUO XR 5-1000 MG tablet, TAKE 2 TABLETS DAILY, Disp: 180 tablet, Rfl: 0      ECG 12 Lead  Date/Time: 1/8/2019 2:33 PM  Performed by: Sharonda Rodgers MD  Authorized by: Sharonda Rodgers MD   Interpreted by ED physician: Seth.  Rhythm: sinus rhythm  Rate: normal  Conduction: conduction normal  ST Segments: ST segments normal  T Waves: T waves normal  QRS axis: normal  Other: no other findings  Clinical impression: normal ECG            Lab Results (most recent)     None        A 2 view PA and lateral Chest X-Ray was ordered. My reading of this film is no acute consolidation, diffuse bronchial markings, no noted crit megaly, no pleural effusions. (No comparison films available: pending review by Radiologist.)      Ria was seen today for shortness of breath and fatigue.    Diagnoses and all orders for this visit:    Cough  -     XR Chest 2 View  -     BNP  -     Basic Metabolic Panel  -     Ambulatory Referral to Cardiology    Palpitations  -     ECG 12 Lead  -     Ambulatory Referral to Cardiology    Unstable angina (CMS/HCC)  -     Basic Metabolic Panel  -     Ambulatory Referral to Cardiology    Controlled type 2 diabetes mellitus with stage 3 chronic kidney disease, with long-term current use of insulin (CMS/[facility])  -      Ambulatory Referral to Cardiology    Other orders  -     furosemide (LASIX) 20 MG tablet; Take 1 tablet by mouth Daily.      Patient with sx of SOA, palpitations and unstable angina on exertion.  PMH of controlled diabetes, hypertension and prior over all normal stress test except with EF of 53% and ST depression in the inferior leads.   - EKG performed in the office showing:  Acute cardiac event, no ST changes, no T-wave inversions, no current myocardial infarction. Some artifact findings but still present with 6 re-attempts.   - Chest x-ray performed in office showing diffuse bronchial markings concerning for pulmonary edema  - Shortness of breath and feelings of periodic palpitations and exertional angina could be from a cardiac origin, patient is high risk for cardiac event.  It also could be from a pulmonary origin, she was diagnosed with bronchitis and an ear infection one month ago and has had residual cough since then.  X-ray without signs of pneumonia but with diffuse pulmonary markings concerning for volume overload.  Start with prescription for furosemide, Possible cardiac cough?  If BNP normal then consider pulmonary causes.   -  Referral to cardiology given.  Stable currently, OK to FU outpatient.     In Room: 2:10 - out 3:30  30 minutes was spent of the 45 minute visit in direct face to face counseling and coordination of care regarding:  Counseling for unstable angina/ SOA and cough and coordination of care  Encounter Diagnoses   Name Primary?   • Cough Yes   • Palpitations    • Unstable angina (CMS/HCC)    • Controlled type 2 diabetes mellitus with stage 3 chronic kidney disease, with long-term current use of insulin (CMS/HCC)        Return in about 4 weeks (around 2/5/2019), or if symptoms worsen or fail to improve, for Recheck SOA .      Sharonda Rodgers MD

## 2019-01-09 LAB
BNP SERPL-MCNC: 13.2 PG/ML (ref 0–100)
BUN SERPL-MCNC: 20 MG/DL (ref 8–23)
BUN/CREAT SERPL: 20 (ref 7–25)
CALCIUM SERPL-MCNC: 10.3 MG/DL (ref 8.6–10.5)
CHLORIDE SERPL-SCNC: 102 MMOL/L (ref 98–107)
CO2 SERPL-SCNC: 25 MMOL/L (ref 22–29)
CREAT SERPL-MCNC: 1 MG/DL (ref 0.57–1)
GLUCOSE SERPL-MCNC: 148 MG/DL (ref 65–99)
POTASSIUM SERPL-SCNC: 4.3 MMOL/L (ref 3.5–5.2)
SODIUM SERPL-SCNC: 142 MMOL/L (ref 136–145)

## 2019-01-09 RX ORDER — PREDNISONE 20 MG/1
40 TABLET ORAL DAILY
Qty: 10 TABLET | Refills: 0 | Status: SHIPPED | OUTPATIENT
Start: 2019-01-09 | End: 2019-01-14

## 2019-01-09 RX ORDER — AZITHROMYCIN 250 MG/1
TABLET, FILM COATED ORAL
Qty: 6 TABLET | Refills: 0 | Status: SHIPPED | OUTPATIENT
Start: 2019-01-09 | End: 2019-01-15

## 2019-01-09 NOTE — PROGRESS NOTES
Please call patient back with results.  The Xrya has resulted as negative for PNA from the radiologist, however with your symptoms I think we should continue the course as we discussed.  FU with me if not improving.     Thank you

## 2019-01-15 ENCOUNTER — OFFICE VISIT (OUTPATIENT)
Dept: CARDIOLOGY | Facility: CLINIC | Age: 64
End: 2019-01-15

## 2019-01-15 ENCOUNTER — TRANSCRIBE ORDERS (OUTPATIENT)
Dept: CARDIOLOGY | Facility: CLINIC | Age: 64
End: 2019-01-15

## 2019-01-15 ENCOUNTER — APPOINTMENT (OUTPATIENT)
Dept: LAB | Facility: HOSPITAL | Age: 64
End: 2019-01-15

## 2019-01-15 ENCOUNTER — LAB (OUTPATIENT)
Dept: LAB | Facility: HOSPITAL | Age: 64
End: 2019-01-15

## 2019-01-15 VITALS
BODY MASS INDEX: 48.82 KG/M2 | HEIGHT: 65 IN | WEIGHT: 293 LBS | HEART RATE: 86 BPM | SYSTOLIC BLOOD PRESSURE: 134 MMHG | DIASTOLIC BLOOD PRESSURE: 76 MMHG

## 2019-01-15 DIAGNOSIS — I20.8 ANGINA EFFORT: ICD-10-CM

## 2019-01-15 DIAGNOSIS — Z13.6 SCREENING FOR CARDIOVASCULAR CONDITION: Primary | ICD-10-CM

## 2019-01-15 DIAGNOSIS — I20.8 ANGINA EFFORT: Primary | ICD-10-CM

## 2019-01-15 DIAGNOSIS — Z01.810 PREPROCEDURAL CARDIOVASCULAR EXAMINATION: ICD-10-CM

## 2019-01-15 DIAGNOSIS — Z13.6 SCREENING FOR CARDIOVASCULAR CONDITION: ICD-10-CM

## 2019-01-15 LAB
ANION GAP SERPL CALCULATED.3IONS-SCNC: 15 MMOL/L
BASOPHILS # BLD AUTO: 0.03 10*3/MM3 (ref 0–0.2)
BASOPHILS NFR BLD AUTO: 0.3 % (ref 0–1.5)
BUN BLD-MCNC: 28 MG/DL (ref 8–23)
BUN/CREAT SERPL: 24.3 (ref 7–25)
CALCIUM SPEC-SCNC: 9.8 MG/DL (ref 8.6–10.5)
CHLORIDE SERPL-SCNC: 99 MMOL/L (ref 98–107)
CO2 SERPL-SCNC: 26 MMOL/L (ref 22–29)
CREAT BLD-MCNC: 1.15 MG/DL (ref 0.57–1)
DEPRECATED RDW RBC AUTO: 48.9 FL (ref 37–54)
EOSINOPHIL # BLD AUTO: 0.08 10*3/MM3 (ref 0–0.7)
EOSINOPHIL NFR BLD AUTO: 0.8 % (ref 0.3–6.2)
ERYTHROCYTE [DISTWIDTH] IN BLOOD BY AUTOMATED COUNT: 17.4 % (ref 11.7–13)
GFR SERPL CREATININE-BSD FRML MDRD: 58 ML/MIN/1.73
GLUCOSE BLD-MCNC: 142 MG/DL (ref 65–99)
HCT VFR BLD AUTO: 42.1 % (ref 35.6–45.5)
HGB BLD-MCNC: 12.9 G/DL (ref 11.9–15.5)
IMM GRANULOCYTES # BLD AUTO: 0.03 10*3/MM3 (ref 0–0.03)
IMM GRANULOCYTES NFR BLD AUTO: 0.3 % (ref 0–0.5)
LYMPHOCYTES # BLD AUTO: 2.62 10*3/MM3 (ref 0.9–4.8)
LYMPHOCYTES NFR BLD AUTO: 26.3 % (ref 19.6–45.3)
MCH RBC QN AUTO: 23.5 PG (ref 26.9–32)
MCHC RBC AUTO-ENTMCNC: 30.6 G/DL (ref 32.4–36.3)
MCV RBC AUTO: 76.5 FL (ref 80.5–98.2)
MONOCYTES # BLD AUTO: 0.63 10*3/MM3 (ref 0.2–1.2)
MONOCYTES NFR BLD AUTO: 6.3 % (ref 5–12)
NEUTROPHILS # BLD AUTO: 6.56 10*3/MM3 (ref 1.9–8.1)
NEUTROPHILS NFR BLD AUTO: 66 % (ref 42.7–76)
PLATELET # BLD AUTO: 427 10*3/MM3 (ref 140–500)
PMV BLD AUTO: 10.3 FL (ref 6–12)
POTASSIUM BLD-SCNC: 3.6 MMOL/L (ref 3.5–5.2)
RBC # BLD AUTO: 5.5 10*6/MM3 (ref 3.9–5.2)
SODIUM BLD-SCNC: 140 MMOL/L (ref 136–145)
WBC NRBC COR # BLD: 9.95 10*3/MM3 (ref 4.5–10.7)

## 2019-01-15 PROCEDURE — 85025 COMPLETE CBC W/AUTO DIFF WBC: CPT

## 2019-01-15 PROCEDURE — 80048 BASIC METABOLIC PNL TOTAL CA: CPT

## 2019-01-15 PROCEDURE — 36415 COLL VENOUS BLD VENIPUNCTURE: CPT

## 2019-01-15 PROCEDURE — 93000 ELECTROCARDIOGRAM COMPLETE: CPT | Performed by: INTERNAL MEDICINE

## 2019-01-15 PROCEDURE — 99204 OFFICE O/P NEW MOD 45 MIN: CPT | Performed by: INTERNAL MEDICINE

## 2019-01-15 NOTE — PROGRESS NOTES
Subjective:     Encounter Date:01/15/2019      Patient ID: Ria Chan is a 63 y.o. female.    Chief Complaint:  This is a 63-year-old lady with a past medical history of hyperlipidemia, hypertension, type 2 diabetes on insulin.  She recently presented to her primary care physician Dr. Rodgers noting shortness of breath with exertion. EKG at that time did not show any acute ischemic changes and CXR showed no pulmonary edema. BNP wnl as well. Today she states she has exertional chest tightness, dyspnea, with diaphoresis, dizziness and occasional nausea. This can occur with as little activity as walking around the house and doing dishes. The pain is non radiaiting and symptoms subside after 5 - 10 minutes and with rest. She does not have pain at rest. These symptoms started in November. Prior to that she was able to complete most of her regular home activities. In November she had a cough with dark sputum and wheezing. .Was diagnosed with bronchitis and treated with inhalers and antibiotics. Those symptoms have resolved but it was at that time that the chest discomfort began.   Used to work at [employer] office, now retired. Non smoker. .       Shortness of Breath   This is a new problem. The current episode started more than 1 month ago. The problem occurs daily. The problem has been waxing and waning. The average episode lasts 10 minutes. Associated symptoms include chest pain, sputum production and wheezing. Pertinent negatives include no abdominal pain, claudication, coryza, ear pain, fever, headaches, hemoptysis, leg pain, leg swelling, neck pain, orthopnea, PND, rash, rhinorrhea, sore throat, swollen glands, syncope or vomiting. The symptoms are aggravated by exercise and any activity.       The following portions of the patient's history were reviewed and updated as appropriate: allergies, current medications, past family history, past medical history, past social history, past surgical history  and problem list.    Past Medical History:   Diagnosis Date   • Cholelithiasis     GB removed 6/2016   • Diabetes mellitus (CMS/HCC)    • Hyperlipidemia    • Hypertension    • Type 2 diabetes mellitus (CMS/HCC)        Family History   Problem Relation Age of Onset   • Diabetes Father    • Hypertension Father    • Kidney disease Father    • Uterine cancer Mother         metastatic    • Uterine cancer Maternal Grandmother    • Osteoarthritis Sister    • Breast cancer Neg Hx    • Colon cancer Neg Hx    • Heart attack Neg Hx    • Stroke Neg Hx        Social History     Socioeconomic History   • Marital status:      Spouse name: Not on file   • Number of children: Not on file   • Years of education: Not on file   • Highest education level: Not on file   Tobacco Use   • Smoking status: Never Smoker   • Smokeless tobacco: Never Used   Substance and Sexual Activity   • Alcohol use: Yes     Alcohol/week: 0.6 oz     Types: 1 Glasses of wine per week     Comment: socially /SELDOM   • Drug use: No   • Sexual activity: Yes     Partners: Male       Allergies   Allergen Reactions   • Aliskiren Other (See Comments)     Causes patient to go into a coma   • Ace Inhibitors Angioedema   • Diclofenac Angioedema   • Sulfamethoxazole-Trimethoprim Nausea Only       Past Surgical History:   Procedure Laterality Date   • CHOLECYSTECTOMY WITH INTRAOPERATIVE CHOLANGIOGRAM N/A 6/4/2016    Procedure: CHOLECYSTECTOMY LAPAROSCOPIC INTRAOPERATIVE CHOLANGIOGRAM;  Surgeon: Luis Snyder MD;  Location: [facility];  Service:    • HYSTEROSCOPY     • POLYPECTOMY     • TUBAL ABDOMINAL LIGATION         Review of Systems   Constitution: Positive for diaphoresis. Negative for fever.   HENT: Negative for ear pain, rhinorrhea and sore throat.    Cardiovascular: Positive for chest pain and dyspnea on exertion. Negative for claudication, leg swelling, orthopnea, paroxysmal nocturnal dyspnea and syncope.   Respiratory: Positive for shortness  of breath, sputum production and wheezing. Negative for hemoptysis.    Skin: Negative for rash.   Musculoskeletal: Negative for neck pain.   Gastrointestinal: Negative for abdominal pain and vomiting.   Neurological: Positive for dizziness. Negative for headaches.         ECG 12 Lead  Date/Time: 1/15/2019 12:20 PM  Performed by: Milla Daniel MD  Authorized by: Milla Daniel MD   Comparison: compared with previous ECG from 1/8/2019  Similar to previous ECG  Rhythm: sinus rhythm  Clinical impression: abnormal ECG  Comments: Inferior downsloping ST depressions/ TWI which are unchanged from 2015. Lateral <1mm ST depressions               Objective:     Physical Exam  GEN: no distress, alert and oriented. obese  Lungs CTAB, no rales or wheezes  Chest: no abnormalities  Heart: RRR, no murmurs  Abdo: soft,  Nontender, nondistended  Extr: no edema, +2 DP and 2+ carotid pulses b/l  Skin: no rash or bruising  Psych: organized thought, normal behavior and affect    Lab Review:         Assessment:          Diagnosis Plan   1. Angina effort (CMS/[facility])  Case Request Cath Lab: Coronary angiography, Left heart catheterization, Left ventricular angiography          Plan:         1. Angina- stable, ongoing since November with no significant change in frequency or intensity. However, I am concerned because she doesn't have to do much exertion for this to occur. I will schedule for LHC. I asked the patient to have the cath this week, but she has conflicts in her schedule and her  is only available to bring her/ take her home next Friday. I think it is ok to wait until then, however explained to the patient that if the pain is persistent or occurs at rest she needs to call the office and come to the ER.   She should continue ASA, Amlodipine, Lasix and ARB.     2. DM: on insulin and orals. I asked her to hold her oral night time dose of hypo-glycemics the night prior.   3. HTN - well controlled.  4 .HLD- crestor 10     Dr. Rodgers,  thank you for referring this patient to me.     Milla Daniel MD  01/15/19

## 2019-01-15 NOTE — H&P (VIEW-ONLY)
Subjective:     Encounter Date:01/15/2019      Patient ID: Ria Chan is a 63 y.o. female.    Chief Complaint:  This is a 63-year-old lady with a past medical history of hyperlipidemia, hypertension, type 2 diabetes on insulin.  She recently presented to her primary care physician Dr. Rodgers noting shortness of breath with exertion. EKG at that time did not show any acute ischemic changes and CXR showed no pulmonary edema. BNP wnl as well. Today she states she has exertional chest tightness, dyspnea, with diaphoresis, dizziness and occasional nausea. This can occur with as little activity as walking around the house and doing dishes. The pain is non radiaiting and symptoms subside after 5 - 10 minutes and with rest. She does not have pain at rest. These symptoms started in November. Prior to that she was able to complete most of her regular home activities. In November she had a cough with dark sputum and wheezing. .Was diagnosed with bronchitis and treated with inhalers and antibiotics. Those symptoms have resolved but it was at that time that the chest discomfort began.   Used to work at [employer] office, now retired. Non smoker. .       Shortness of Breath   This is a new problem. The current episode started more than 1 month ago. The problem occurs daily. The problem has been waxing and waning. The average episode lasts 10 minutes. Associated symptoms include chest pain, sputum production and wheezing. Pertinent negatives include no abdominal pain, claudication, coryza, ear pain, fever, headaches, hemoptysis, leg pain, leg swelling, neck pain, orthopnea, PND, rash, rhinorrhea, sore throat, swollen glands, syncope or vomiting. The symptoms are aggravated by exercise and any activity.       The following portions of the patient's history were reviewed and updated as appropriate: allergies, current medications, past family history, past medical history, past social history, past surgical history  and problem list.    Past Medical History:   Diagnosis Date   • Cholelithiasis     GB removed 6/2016   • Diabetes mellitus (CMS/HCC)    • Hyperlipidemia    • Hypertension    • Type 2 diabetes mellitus (CMS/HCC)        Family History   Problem Relation Age of Onset   • Diabetes Father    • Hypertension Father    • Kidney disease Father    • Uterine cancer Mother         metastatic    • Uterine cancer Maternal Grandmother    • Osteoarthritis Sister    • Breast cancer Neg Hx    • Colon cancer Neg Hx    • Heart attack Neg Hx    • Stroke Neg Hx        Social History     Socioeconomic History   • Marital status:      Spouse name: Not on file   • Number of children: Not on file   • Years of education: Not on file   • Highest education level: Not on file   Tobacco Use   • Smoking status: Never Smoker   • Smokeless tobacco: Never Used   Substance and Sexual Activity   • Alcohol use: Yes     Alcohol/week: 0.6 oz     Types: 1 Glasses of wine per week     Comment: socially /SELDOM   • Drug use: No   • Sexual activity: Yes     Partners: Male       Allergies   Allergen Reactions   • Aliskiren Other (See Comments)     Causes patient to go into a coma   • Ace Inhibitors Angioedema   • Diclofenac Angioedema   • Sulfamethoxazole-Trimethoprim Nausea Only       Past Surgical History:   Procedure Laterality Date   • CHOLECYSTECTOMY WITH INTRAOPERATIVE CHOLANGIOGRAM N/A 6/4/2016    Procedure: CHOLECYSTECTOMY LAPAROSCOPIC INTRAOPERATIVE CHOLANGIOGRAM;  Surgeon: Luis Snyder MD;  Location: [facility];  Service:    • HYSTEROSCOPY     • POLYPECTOMY     • TUBAL ABDOMINAL LIGATION         Review of Systems   Constitution: Positive for diaphoresis. Negative for fever.   HENT: Negative for ear pain, rhinorrhea and sore throat.    Cardiovascular: Positive for chest pain and dyspnea on exertion. Negative for claudication, leg swelling, orthopnea, paroxysmal nocturnal dyspnea and syncope.   Respiratory: Positive for shortness  of breath, sputum production and wheezing. Negative for hemoptysis.    Skin: Negative for rash.   Musculoskeletal: Negative for neck pain.   Gastrointestinal: Negative for abdominal pain and vomiting.   Neurological: Positive for dizziness. Negative for headaches.         ECG 12 Lead  Date/Time: 1/15/2019 12:20 PM  Performed by: Milla Daniel MD  Authorized by: Milla Daniel MD   Comparison: compared with previous ECG from 1/8/2019  Similar to previous ECG  Rhythm: sinus rhythm  Clinical impression: abnormal ECG  Comments: Inferior downsloping ST depressions/ TWI which are unchanged from 2015. Lateral <1mm ST depressions               Objective:     Physical Exam  GEN: no distress, alert and oriented. obese  Lungs CTAB, no rales or wheezes  Chest: no abnormalities  Heart: RRR, no murmurs  Abdo: soft,  Nontender, nondistended  Extr: no edema, +2 DP and 2+ carotid pulses b/l  Skin: no rash or bruising  Psych: organized thought, normal behavior and affect    Lab Review:         Assessment:          Diagnosis Plan   1. Angina effort (CMS/[facility])  Case Request Cath Lab: Coronary angiography, Left heart catheterization, Left ventricular angiography          Plan:         1. Angina- stable, ongoing since November with no significant change in frequency or intensity. However, I am concerned because she doesn't have to do much exertion for this to occur. I will schedule for LHC. I asked the patient to have the cath this week, but she has conflicts in her schedule and her  is only available to bring her/ take her home next Friday. I think it is ok to wait until then, however explained to the patient that if the pain is persistent or occurs at rest she needs to call the office and come to the ER.   She should continue ASA, Amlodipine, Lasix and ARB.     2. DM: on insulin and orals. I asked her to hold her oral night time dose of hypo-glycemics the night prior.   3. HTN - well controlled.  4 .HLD- crestor 10     Dr. Rodgers,  thank you for referring this patient to me.     Milla Daniel MD  01/15/19

## 2019-01-18 ENCOUNTER — OFFICE VISIT (OUTPATIENT)
Dept: ENDOCRINOLOGY | Age: 64
End: 2019-01-18

## 2019-01-18 VITALS
HEIGHT: 65 IN | SYSTOLIC BLOOD PRESSURE: 134 MMHG | WEIGHT: 293 LBS | DIASTOLIC BLOOD PRESSURE: 84 MMHG | BODY MASS INDEX: 48.82 KG/M2

## 2019-01-18 DIAGNOSIS — I10 BENIGN HYPERTENSION: ICD-10-CM

## 2019-01-18 DIAGNOSIS — Z79.4 CONTROLLED TYPE 2 DIABETES MELLITUS WITH COMPLICATION, WITH LONG-TERM CURRENT USE OF INSULIN (HCC): Primary | ICD-10-CM

## 2019-01-18 DIAGNOSIS — R60.0 LOCALIZED EDEMA: ICD-10-CM

## 2019-01-18 DIAGNOSIS — E78.5 DYSLIPIDEMIA: ICD-10-CM

## 2019-01-18 DIAGNOSIS — E66.01 MORBID OBESITY (HCC): ICD-10-CM

## 2019-01-18 DIAGNOSIS — E11.8 CONTROLLED TYPE 2 DIABETES MELLITUS WITH COMPLICATION, WITH LONG-TERM CURRENT USE OF INSULIN (HCC): Primary | ICD-10-CM

## 2019-01-18 PROCEDURE — 99214 OFFICE O/P EST MOD 30 MIN: CPT | Performed by: NURSE PRACTITIONER

## 2019-01-18 NOTE — PROGRESS NOTES
"Subjective   Ria Chan is a 63 y.o. female is here today for follow-up.  Chief Complaint   Patient presents with   • Diabetes     recent labs, testing BG 1 time daily, pt brought meter   • Hyperlipidemia   • Hypertension     /84   Ht 165.1 cm (65\")   Wt 136 kg (300 lb)   BMI 49.92 kg/m²   Current Outpatient Medications on File Prior to Visit   Medication Sig   • amLODIPine (NORVASC) 10 MG tablet TAKE 1 TABLET DAILY   • aspirin 81 MG tablet Take 81 mg by mouth daily.   • BD PEN NEEDLE SERVANDO U/F 32G X 4 MM misc 4 (four) times a day.   • BYDUREON 2 MG pen-injector injection INJECT 2 MG UNDER THE SKIN ONE TIME PER WEEK   • furosemide (LASIX) 20 MG tablet Take 1 tablet by mouth Daily.   • Insulin Glargine (TOUJEO SOLOSTAR) 300 UNIT/ML solution pen-injector Inject 225 Units under the skin Daily. (Patient taking differently: Inject 160 Units under the skin Daily.)   • irbesartan (AVAPRO) 150 MG tablet    • Multiple Vitamins-Minerals (MULTIVITAMIN ADULT PO) Take 1 tablet by mouth daily.   • triamterene-hydrochlorothiazide (MAXZIDE-25) 37.5-25 MG per tablet Take 1 tablet by mouth Daily.   • XIGDUO XR 5-1000 MG tablet TAKE 2 TABLETS DAILY   • albuterol (PROVENTIL HFA;VENTOLIN HFA) 108 (90 Base) MCG/ACT inhaler Inhale 2 puffs 4 (Four) Times a Day.   • [DISCONTINUED] Blood Glucose Calibration (OT ULTRA/FASTTK CNTRL SOLN) solution Use as directed   • [DISCONTINUED] Blood Glucose Monitoring Suppl (ONE TOUCH ULTRA 2) w/Device kit Use 3 times a day   • [DISCONTINUED] clindamycin (CLEOCIN T) 1 % lotion Apply  topically Every 12 (Twelve) Hours.   • [DISCONTINUED] ONE TOUCH ULTRA TEST test strip Use 3 times a day   • [DISCONTINUED] ONETOUCH DELICA LANCETS 33G misc Use 3 times a day   • [DISCONTINUED] TOUJEO SOLOSTAR 300 UNIT/ML solution pen-injector INJECT 225 UNITS UNDER THE SKIN DAILY     No current facility-administered medications on file prior to visit.      Family History   Problem Relation Age of Onset   • " Diabetes Father    • Hypertension Father    • Kidney disease Father    • Uterine cancer Mother         metastatic    • Uterine cancer Maternal Grandmother    • Osteoarthritis Sister    • Breast cancer Neg Hx    • Colon cancer Neg Hx    • Heart attack Neg Hx    • Stroke Neg Hx      Social History     Tobacco Use   • Smoking status: Never Smoker   • Smokeless tobacco: Never Used   Substance Use Topics   • Alcohol use: Yes     Alcohol/week: 0.6 oz     Types: 1 Glasses of wine per week     Comment: socially /SELDOM   • Drug use: No     Allergies   Allergen Reactions   • Aliskiren Other (See Comments)     Causes patient to go into a coma   • Ace Inhibitors Angioedema   • Diclofenac Angioedema   • Sulfamethoxazole-Trimethoprim Nausea Only         History of Present Illness  Encounter Diagnoses   Name Primary?   • Morbid obesity (CMS/HCC)    • Localized edema    • Benign hypertension    • Controlled type 2 diabetes mellitus with complication, with long-term current use of insulin (CMS/HCC) Yes   • Dyslipidemia    63-year-old male patient here today for routine follow-up visit.  She is being seen for the above-mentioned problems.  She is taking her medications as prescribed.  She states she recently had a bout of bronchitis and as result was experiencing chest pains.  She has follow-up with a cardiologist and has plans to have surgery next week to determine if she has any blockages.  She has had no hypoglycemic events.  She checks her blood sugars on occasion and her blood sugar download reflects once a day testing.  Her highest blood sugar reading has been 202 at her lowest blood sugar reading has been 74.  Her hemoglobin A1c is up slightly since her last visit.  She is increased her weight by a few pounds.  She states she feels her blood sugars about higher due to the holidays.      The following portions of the patient's history were reviewed and updated as appropriate: allergies, current medications, past family  history, past medical history, past social history, past surgical history and problem list.    Review of Systems   Constitutional: Negative for fatigue.   HENT: Negative for trouble swallowing.    Eyes: Negative for visual disturbance.   Cardiovascular: Negative for leg swelling.   Endocrine: Negative for polyuria.   Skin: Negative for wound.   Neurological: Negative for numbness.       Objective   Physical Exam   Constitutional: She is oriented to person, place, and time. She appears well-developed and well-nourished. No distress.   HENT:   Head: Normocephalic and atraumatic.   Right Ear: External ear normal.   Left Ear: External ear normal.   Nose: Nose normal.   Mouth/Throat: Oropharynx is clear and moist. No oropharyngeal exudate.   Eyes: EOM are normal. Pupils are equal, round, and reactive to light. Right eye exhibits no discharge. Left eye exhibits no discharge.   Neck: Trachea normal, normal range of motion and full passive range of motion without pain. Neck supple. No tracheal tenderness present. Carotid bruit is not present. No tracheal deviation, no edema and no erythema present. No thyroid mass and no thyromegaly present.   Cardiovascular: Normal rate, regular rhythm, normal heart sounds and intact distal pulses. Exam reveals no gallop and no friction rub.   No murmur heard.  Pulmonary/Chest: Effort normal and breath sounds normal. No stridor. No respiratory distress. She has no wheezes. She has no rales.   Abdominal: Soft. Bowel sounds are normal. She exhibits no distension.   Musculoskeletal: Normal range of motion. She exhibits no edema or deformity.   Lymphadenopathy:     She has no cervical adenopathy.   Neurological: She is alert and oriented to person, place, and time.   Skin: Skin is warm and dry. No rash noted. She is not diaphoretic. No erythema. No pallor.   Acanthosis nigricans   Psychiatric: She has a normal mood and affect. Her behavior is normal. Judgment and thought content normal.    Nursing note and vitals reviewed.    Results for orders placed or performed in visit on 01/15/19   Basic Metabolic Panel   Result Value Ref Range    Glucose 142 (H) 65 - 99 mg/dL    BUN 28 (H) 8 - 23 mg/dL    Creatinine 1.15 (H) 0.57 - 1.00 mg/dL    Sodium 140 136 - 145 mmol/L    Potassium 3.6 3.5 - 5.2 mmol/L    Chloride 99 98 - 107 mmol/L    CO2 26.0 22.0 - 29.0 mmol/L    Calcium 9.8 8.6 - 10.5 mg/dL    eGFR  African Amer 58 (L) >60 mL/min/1.73    BUN/Creatinine Ratio 24.3 7.0 - 25.0    Anion Gap 15.0 mmol/L   CBC Auto Differential   Result Value Ref Range    WBC 9.95 4.50 - 10.70 10*3/mm3    RBC 5.50 (H) 3.90 - 5.20 10*6/mm3    Hemoglobin 12.9 11.9 - 15.5 g/dL    Hematocrit 42.1 35.6 - 45.5 %    MCV 76.5 (L) 80.5 - 98.2 fL    MCH 23.5 (L) 26.9 - 32.0 pg    MCHC 30.6 (L) 32.4 - 36.3 g/dL    RDW 17.4 (H) 11.7 - 13.0 %    RDW-SD 48.9 37.0 - 54.0 fl    MPV 10.3 6.0 - 12.0 fL    Platelets 427 140 - 500 10*3/mm3    Neutrophil % 66.0 42.7 - 76.0 %    Lymphocyte % 26.3 19.6 - 45.3 %    Monocyte % 6.3 5.0 - 12.0 %    Eosinophil % 0.8 0.3 - 6.2 %    Basophil % 0.3 0.0 - 1.5 %    Immature Grans % 0.3 0.0 - 0.5 %    Neutrophils, Absolute 6.56 1.90 - 8.10 10*3/mm3    Lymphocytes, Absolute 2.62 0.90 - 4.80 10*3/mm3    Monocytes, Absolute 0.63 0.20 - 1.20 10*3/mm3    Eosinophils, Absolute 0.08 0.00 - 0.70 10*3/mm3    Basophils, Absolute 0.03 0.00 - 0.20 10*3/mm3    Immature Grans, Absolute 0.03 0.00 - 0.03 10*3/mm3         Assessment/Plan   Encounter Diagnoses   Name Primary?   • Morbid obesity (CMS/HCC)    • Localized edema    • Benign hypertension    • Controlled type 2 diabetes mellitus with complication, with long-term current use of insulin (CMS/HCC) Yes   • Dyslipidemia      In summary, patient was seen and examined.  Metabolically she is stable.  She'll continue all her current medications as prescribed.  Her blood pressure is an satisfactory range.  Her A1c is up above goal of 7.  She is getting ready to have surgery  to evaluate her cardiac status.  I've asked that she contact the office should she have any questions or concerns of her blood sugars change as result.  She will follow-up with myself or Dr. Carrillo in 6 months.

## 2019-01-25 ENCOUNTER — HOSPITAL ENCOUNTER (OUTPATIENT)
Facility: HOSPITAL | Age: 64
Setting detail: HOSPITAL OUTPATIENT SURGERY
Discharge: HOME | End: 2019-01-25
Attending: INTERNAL MEDICINE | Admitting: INTERNAL MEDICINE

## 2019-01-25 VITALS
BODY MASS INDEX: 48.82 KG/M2 | OXYGEN SATURATION: 96 % | RESPIRATION RATE: 18 BRPM | DIASTOLIC BLOOD PRESSURE: 71 MMHG | HEART RATE: 75 BPM | HEIGHT: 65 IN | WEIGHT: 293 LBS | TEMPERATURE: 97.5 F | SYSTOLIC BLOOD PRESSURE: 136 MMHG

## 2019-01-25 DIAGNOSIS — I20.8 ANGINA EFFORT: ICD-10-CM

## 2019-01-25 LAB — GLUCOSE BLDC GLUCOMTR-MCNC: 111 MG/DL (ref 70–130)

## 2019-01-25 PROCEDURE — 25010000002 FENTANYL CITRATE (PF) 100 MCG/2ML SOLUTION: Performed by: INTERNAL MEDICINE

## 2019-01-25 PROCEDURE — 93458 L HRT ARTERY/VENTRICLE ANGIO: CPT | Performed by: INTERNAL MEDICINE

## 2019-01-25 PROCEDURE — 25010000002 MIDAZOLAM PER 1 MG: Performed by: INTERNAL MEDICINE

## 2019-01-25 PROCEDURE — C1894 INTRO/SHEATH, NON-LASER: HCPCS | Performed by: INTERNAL MEDICINE

## 2019-01-25 PROCEDURE — 0 IOPAMIDOL PER 1 ML: Performed by: INTERNAL MEDICINE

## 2019-01-25 PROCEDURE — C1769 GUIDE WIRE: HCPCS | Performed by: INTERNAL MEDICINE

## 2019-01-25 PROCEDURE — 25010000002 HEPARIN (PORCINE) PER 1000 UNITS: Performed by: INTERNAL MEDICINE

## 2019-01-25 PROCEDURE — 99152 MOD SED SAME PHYS/QHP 5/>YRS: CPT | Performed by: INTERNAL MEDICINE

## 2019-01-25 PROCEDURE — 82962 GLUCOSE BLOOD TEST: CPT

## 2019-01-25 RX ORDER — FENTANYL CITRATE 50 UG/ML
INJECTION, SOLUTION INTRAMUSCULAR; INTRAVENOUS AS NEEDED
Status: DISCONTINUED | OUTPATIENT
Start: 2019-01-25 | End: 2019-01-25 | Stop reason: HOSPADM

## 2019-01-25 RX ORDER — SODIUM CHLORIDE 0.9 % (FLUSH) 0.9 %
3 SYRINGE (ML) INJECTION EVERY 12 HOURS SCHEDULED
Status: DISCONTINUED | OUTPATIENT
Start: 2019-01-25 | End: 2019-01-25 | Stop reason: HOSPADM

## 2019-01-25 RX ORDER — SODIUM CHLORIDE 0.9 % (FLUSH) 0.9 %
3-10 SYRINGE (ML) INJECTION AS NEEDED
Status: DISCONTINUED | OUTPATIENT
Start: 2019-01-25 | End: 2019-01-25 | Stop reason: HOSPADM

## 2019-01-25 RX ORDER — SODIUM CHLORIDE 9 MG/ML
75 INJECTION, SOLUTION INTRAVENOUS CONTINUOUS
Status: DISCONTINUED | OUTPATIENT
Start: 2019-01-25 | End: 2019-01-25 | Stop reason: HOSPADM

## 2019-01-25 RX ORDER — ROSUVASTATIN CALCIUM 20 MG/1
20 TABLET, COATED ORAL DAILY
COMMUNITY
End: 2019-02-12 | Stop reason: SDUPTHER

## 2019-01-25 RX ORDER — MIDAZOLAM HYDROCHLORIDE 1 MG/ML
INJECTION INTRAMUSCULAR; INTRAVENOUS AS NEEDED
Status: DISCONTINUED | OUTPATIENT
Start: 2019-01-25 | End: 2019-01-25 | Stop reason: HOSPADM

## 2019-01-25 RX ORDER — LIDOCAINE HYDROCHLORIDE 10 MG/ML
0.1 INJECTION, SOLUTION EPIDURAL; INFILTRATION; INTRACAUDAL; PERINEURAL ONCE AS NEEDED
Status: DISCONTINUED | OUTPATIENT
Start: 2019-01-25 | End: 2019-01-25 | Stop reason: HOSPADM

## 2019-01-25 RX ORDER — SODIUM CHLORIDE 9 MG/ML
100 INJECTION, SOLUTION INTRAVENOUS CONTINUOUS
Status: DISCONTINUED | OUTPATIENT
Start: 2019-01-25 | End: 2019-01-25 | Stop reason: HOSPADM

## 2019-01-25 RX ORDER — LIDOCAINE HYDROCHLORIDE 20 MG/ML
INJECTION, SOLUTION INFILTRATION; PERINEURAL AS NEEDED
Status: DISCONTINUED | OUTPATIENT
Start: 2019-01-25 | End: 2019-01-25 | Stop reason: HOSPADM

## 2019-01-25 RX ADMIN — SODIUM CHLORIDE 75 ML/HR: 9 INJECTION, SOLUTION INTRAVENOUS at 07:43

## 2019-01-25 NOTE — DISCHARGE INSTRUCTIONS
Resume your Xigduo in 48 hours    [facility]  [address] [place] [address]    Coronary Angiogram (Radial/Ulnar Approach) After Care    Refer to this sheet in the next few weeks. These instructions provide you with information on caring for yourself after your procedure. Your caregiver may also give you more specific instructions. Your treatment has been planned according to current medical practices, but problems sometimes occur. Call your caregiver if you have any problems or questions after your procedure.    Home Care Instructions:  · You may shower the day after the procedure. Remove the bandage (dressing) and gently wash the site with plain soap and water. Gently pat the site dry. You may apply a band aid daily for 2 days if desired.    · Do not apply powder or lotion to the site.  · Do not submerge the affected site in water for 3 to 5 days or until the site is completely healed.   · Do not lift, push or pull anything over 10 pounds for 2 days after your procedure.  · Inspect the site at least twice daily. You may notice some bruising at the site and it may be tender for 1 to 2 weeks.     · Increase your fluid intake for the next 2 days.    · Keep arm elevated for 24 hours. For the remainder of the day, keep your arm in “Pledge of Allegiance” position when up and about.     · You may drive 24 hours after the procedure unless otherwise instructed by your caregiver.  · Do not operate machinery or power tools for 24 hours.  · A responsible adult should be with you for the first 24 hours after you arrive home. Do not make any important legal decisions or sign legal papers for 24 hours.  Do not drink alcohol for 24 hours.    · Metformin or any medications containing Metformin should not be taken for 48 hours after your procedure.      Call Your Doctor if:   · You have unusual pain at the radial/ulnar (wrist) site.  · You have redness, warmth, swelling, or pain at the radial/ulnar (wrist)  site.  · You have drainage (other than a small amount of blood on the dressing).  · You have chills or a fever > 101.  · Your arm becomes pale or dark, cool, tingly, or numb.  · You have heavy bleeding from the site, hold pressure on the site for 20 minutes.  If the bleeding stops, apply a fresh bandage and call your cardiologist.  However, if you continue to have bleeding, call 911.

## 2019-02-12 RX ORDER — ROSUVASTATIN CALCIUM 20 MG/1
20 TABLET, COATED ORAL DAILY
Qty: 90 TABLET | Refills: 3 | Status: SHIPPED | OUTPATIENT
Start: 2019-02-12 | End: 2019-07-19 | Stop reason: SDUPTHER

## 2019-02-14 RX ORDER — EXENATIDE 2 MG/.65ML
INJECTION, SUSPENSION, EXTENDED RELEASE SUBCUTANEOUS
Qty: 12 PEN | Refills: 0 | Status: SHIPPED | OUTPATIENT
Start: 2019-02-14 | End: 2019-05-09 | Stop reason: SDUPTHER

## 2019-02-21 RX ORDER — LANCETS 33 GAUGE
EACH MISCELLANEOUS
Qty: 500 EACH | Refills: 0 | Status: SHIPPED | OUTPATIENT
Start: 2019-02-21 | End: 2020-01-28 | Stop reason: CLARIF

## 2019-03-18 RX ORDER — IRBESARTAN 75 MG/1
TABLET ORAL
Qty: 90 TABLET | Refills: 0 | Status: SHIPPED | OUTPATIENT
Start: 2019-03-18 | End: 2019-06-16 | Stop reason: SDUPTHER

## 2019-04-06 DIAGNOSIS — IMO0001 UNCONTROLLED TYPE 2 DIABETES MELLITUS WITHOUT COMPLICATION, WITH LONG-TERM CURRENT USE OF INSULIN: ICD-10-CM

## 2019-04-08 RX ORDER — INSULIN GLARGINE 300 U/ML
INJECTION, SOLUTION SUBCUTANEOUS
Qty: 67.5 ML | Refills: 0 | Status: SHIPPED | OUTPATIENT
Start: 2019-04-08 | End: 2019-07-07 | Stop reason: SDUPTHER

## 2019-04-08 RX ORDER — DAPAGLIFLOZIN AND METFORMIN HYDROCHLORIDE 5; 1000 MG/1; MG/1
TABLET, FILM COATED, EXTENDED RELEASE ORAL
Qty: 180 TABLET | Refills: 0 | Status: SHIPPED | OUTPATIENT
Start: 2019-04-08 | End: 2019-07-07 | Stop reason: SDUPTHER

## 2019-04-26 ENCOUNTER — OFFICE VISIT (OUTPATIENT)
Dept: CARDIOLOGY | Facility: CLINIC | Age: 64
End: 2019-04-26

## 2019-04-26 VITALS
HEIGHT: 65 IN | WEIGHT: 293 LBS | DIASTOLIC BLOOD PRESSURE: 80 MMHG | BODY MASS INDEX: 48.82 KG/M2 | HEART RATE: 83 BPM | SYSTOLIC BLOOD PRESSURE: 120 MMHG

## 2019-04-26 DIAGNOSIS — Z79.4 CONTROLLED TYPE 2 DIABETES MELLITUS WITH COMPLICATION, WITH LONG-TERM CURRENT USE OF INSULIN (HCC): ICD-10-CM

## 2019-04-26 DIAGNOSIS — I10 BENIGN HYPERTENSION: Primary | ICD-10-CM

## 2019-04-26 DIAGNOSIS — E11.8 CONTROLLED TYPE 2 DIABETES MELLITUS WITH COMPLICATION, WITH LONG-TERM CURRENT USE OF INSULIN (HCC): ICD-10-CM

## 2019-04-26 PROCEDURE — 93000 ELECTROCARDIOGRAM COMPLETE: CPT | Performed by: INTERNAL MEDICINE

## 2019-04-26 PROCEDURE — 99213 OFFICE O/P EST LOW 20 MIN: CPT | Performed by: INTERNAL MEDICINE

## 2019-04-26 NOTE — PROGRESS NOTES
Subjective:     Encounter Date: 04/26/19      Patient ID: Ria Chan is a 63 y.o. female.    Chief Complaint:  This is a 63-year-old lady with hypertension, diabetes, hyperlipidemia.  She presented in January to her primary care physician with complaints of exertional dyspnea and chest pain.  I saw her shortly thereafter and elected to to catheter.  Her cardiac catheterization showed no obstructive disease.  Since that time she has been stopped on her Timolol eyedrops and her symptoms are completely resolved.  She walks on the treadmill and bikes for about 30 minutes a day and has no symptoms.  This is when she previously was having symptoms before.  She feels great otherwise.    The following portions of the patient's history were reviewed and updated as appropriate: allergies, current medications, past family history, past medical history, past social history, past surgical history and problem list.    Past Medical History:   Diagnosis Date   • Cholelithiasis     GB removed 6/2016   • Diabetes mellitus (CMS/HCC)    • Hyperlipidemia    • Hypertension    • Type 2 diabetes mellitus (CMS/HCC)        Family History   Problem Relation Age of Onset   • Diabetes Father    • Hypertension Father    • Kidney disease Father    • Uterine cancer Mother         metastatic    • Uterine cancer Maternal Grandmother    • Osteoarthritis Sister    • Breast cancer Neg Hx    • Colon cancer Neg Hx    • Heart attack Neg Hx    • Stroke Neg Hx        Social History     Socioeconomic History   • Marital status:      Spouse name: Not on file   • Number of children: Not on file   • Years of education: Not on file   • Highest education level: Not on file   Tobacco Use   • Smoking status: Never Smoker   • Smokeless tobacco: Never Used   Substance and Sexual Activity   • Alcohol use: Yes     Alcohol/week: 0.6 oz     Types: 1 Glasses of wine per week     Comment: socially /SELDOM   • Drug use: No   • Sexual activity: Yes      Partners: Male       Allergies   Allergen Reactions   • Aliskiren Other (See Comments)     Causes patient to go into a coma   • Timolol Maleate Dizziness, Palpitations, Photosensitivity and Shortness Of Breath   • Ace Inhibitors Angioedema   • Diclofenac Angioedema   • Sulfamethoxazole-Trimethoprim Nausea Only       Past Surgical History:   Procedure Laterality Date   • CARDIAC CATHETERIZATION N/A 1/25/2019    Procedure: Coronary angiography;  Surgeon: Milla Daniel MD;  Location:  [facility];  Service: Cardiology   • CARDIAC CATHETERIZATION N/A 1/25/2019    Procedure: Left Heart Cath;  Surgeon: Milla Daniel MD;  Location:  [facility];  Service: Cardiology   • CARDIAC CATHETERIZATION N/A 1/25/2019    Procedure: Left ventriculography;  Surgeon: Milla Daniel MD;  Location: [facility];  Service: Cardiology   • CHOLECYSTECTOMY WITH INTRAOPERATIVE CHOLANGIOGRAM N/A 6/4/2016    Procedure: CHOLECYSTECTOMY LAPAROSCOPIC INTRAOPERATIVE CHOLANGIOGRAM;  Surgeon: Luis Snyder MD;  Location: [facility] OR;  Service:    • HYSTEROSCOPY     • POLYPECTOMY     • TUBAL ABDOMINAL LIGATION         Review of Systems   HENT: Negative for ear pain, rhinorrhea and sore throat.    Cardiovascular: Negative for dyspnea on exertion and leg swelling.   Respiratory: Positive for shortness of breath. Negative for wheezing.    Skin: Negative for rash.   Musculoskeletal: Negative for neck pain.         ECG 12 Lead  Date/Time: 4/26/2019 12:28 PM  Performed by: Milla Daniel MD  Authorized by: Milla Daniel MD   Comparison: compared with previous ECG from 1/15/2019  Rhythm: sinus rhythm  Rate: normal  Conduction: conduction normal  ST Segments: ST segments normal  T Waves: T waves normal  QRS axis: normal  Other: no other findings    Clinical impression: non-specific ECG  Comments: Downsloping ST depressions 2, 3, aVF, V5 and V6.               Objective:     Physical Exam  GEN:  no distress, alert and oriented. obese  Lungs CTAB, no rales or wheezes  Chest: no abnormalities  Heart: RRR, no murmurs  Abdo: soft,  Nontender, nondistended  Extr: no edema, +2 DP and 2+ carotid pulses b/l  Skin: no rash or bruising  Psych: organized thought, normal behavior and affect    Lab Review:         Assessment:          Diagnosis Plan   1. Benign hypertension     2. Controlled type 2 diabetes mellitus with complication, with long-term current use of insulin (CMS/[facility])            Plan:         1. Precordial pain has now resolved. Unsure if it was actually related to timolol drops (unlikely), but may just be total resolution of bronchitis that she had earlier in the year. Nonetheless, symptoms are gone and cardiac cath was essentially normal.   She should continue ASA, Amlodipine, Lasix and ARB and continue exercising.     2. DM: on insulin and orals. I asked her to hold her oral night time dose of hypo-glycemics the night prior.   3. HTN - well controlled.  4 .HLD- crestor 10     Dr. Rodgers, thank you for referring this patient to me. She should follow up in 1 year with me.     Milla Daniel MD  04/26/19

## 2019-04-30 ENCOUNTER — CONSULT (OUTPATIENT)
Dept: ORTHOPEDIC SURGERY | Facility: CLINIC | Age: 64
End: 2019-04-30

## 2019-04-30 VITALS — WEIGHT: 293 LBS | HEIGHT: 65 IN | BODY MASS INDEX: 48.82 KG/M2 | TEMPERATURE: 97.2 F

## 2019-04-30 DIAGNOSIS — M65.311 TRIGGER FINGER OF RIGHT THUMB: Primary | ICD-10-CM

## 2019-04-30 PROCEDURE — 20600 DRAIN/INJ JOINT/BURSA W/O US: CPT | Performed by: ORTHOPAEDIC SURGERY

## 2019-04-30 PROCEDURE — 99213 OFFICE O/P EST LOW 20 MIN: CPT | Performed by: ORTHOPAEDIC SURGERY

## 2019-04-30 PROCEDURE — 73130 X-RAY EXAM OF HAND: CPT | Performed by: ORTHOPAEDIC SURGERY

## 2019-04-30 RX ADMIN — METHYLPREDNISOLONE ACETATE 80 MG: 80 INJECTION, SUSPENSION INTRA-ARTICULAR; INTRALESIONAL; INTRAMUSCULAR; SOFT TISSUE at 15:06

## 2019-04-30 RX ADMIN — LIDOCAINE HYDROCHLORIDE 1 ML: 10 INJECTION, SOLUTION EPIDURAL; INFILTRATION; INTRACAUDAL; PERINEURAL at 15:06

## 2019-05-06 RX ORDER — LIDOCAINE HYDROCHLORIDE 10 MG/ML
1 INJECTION, SOLUTION EPIDURAL; INFILTRATION; INTRACAUDAL; PERINEURAL
Status: COMPLETED | OUTPATIENT
Start: 2019-04-30 | End: 2019-04-30

## 2019-05-06 RX ORDER — METHYLPREDNISOLONE ACETATE 80 MG/ML
80 INJECTION, SUSPENSION INTRA-ARTICULAR; INTRALESIONAL; INTRAMUSCULAR; SOFT TISSUE
Status: COMPLETED | OUTPATIENT
Start: 2019-04-30 | End: 2019-04-30

## 2019-05-09 RX ORDER — EXENATIDE 2 MG/.65ML
INJECTION, SUSPENSION, EXTENDED RELEASE SUBCUTANEOUS
Qty: 12 PEN | Refills: 0 | Status: SHIPPED | OUTPATIENT
Start: 2019-05-09 | End: 2019-07-19 | Stop reason: SDUPTHER

## 2019-06-10 DIAGNOSIS — R60.9 EDEMA, UNSPECIFIED TYPE: ICD-10-CM

## 2019-06-10 DIAGNOSIS — I10 BENIGN HYPERTENSION: ICD-10-CM

## 2019-06-10 RX ORDER — TRIAMTERENE AND HYDROCHLOROTHIAZIDE 37.5; 25 MG/1; MG/1
TABLET ORAL
Qty: 90 TABLET | Refills: 3 | Status: SHIPPED | OUTPATIENT
Start: 2019-06-10 | End: 2020-06-04

## 2019-06-17 RX ORDER — IRBESARTAN 75 MG/1
TABLET ORAL
Qty: 90 TABLET | Refills: 0 | Status: SHIPPED | OUTPATIENT
Start: 2019-06-17 | End: 2019-09-15 | Stop reason: SDUPTHER

## 2019-07-05 ENCOUNTER — LAB (OUTPATIENT)
Dept: ENDOCRINOLOGY | Age: 64
End: 2019-07-05

## 2019-07-05 DIAGNOSIS — E66.01 MORBID OBESITY (HCC): ICD-10-CM

## 2019-07-05 DIAGNOSIS — N18.30 CONTROLLED TYPE 2 DIABETES MELLITUS WITH STAGE 3 CHRONIC KIDNEY DISEASE, WITH LONG-TERM CURRENT USE OF INSULIN (HCC): ICD-10-CM

## 2019-07-05 DIAGNOSIS — I10 BENIGN HYPERTENSION: ICD-10-CM

## 2019-07-05 DIAGNOSIS — Z79.4 CONTROLLED TYPE 2 DIABETES MELLITUS WITH STAGE 3 CHRONIC KIDNEY DISEASE, WITH LONG-TERM CURRENT USE OF INSULIN (HCC): ICD-10-CM

## 2019-07-05 DIAGNOSIS — E11.22 CONTROLLED TYPE 2 DIABETES MELLITUS WITH STAGE 3 CHRONIC KIDNEY DISEASE, WITH LONG-TERM CURRENT USE OF INSULIN (HCC): ICD-10-CM

## 2019-07-05 DIAGNOSIS — E78.5 DYSLIPIDEMIA: ICD-10-CM

## 2019-07-06 LAB
25(OH)D3+25(OH)D2 SERPL-MCNC: 43.7 NG/ML (ref 30–100)
ALBUMIN SERPL-MCNC: 4.3 G/DL (ref 3.5–5.2)
ALBUMIN/GLOB SERPL: 1.7 G/DL
ALP SERPL-CCNC: 79 U/L (ref 39–117)
ALT SERPL-CCNC: 14 U/L (ref 1–33)
AST SERPL-CCNC: 14 U/L (ref 1–32)
BILIRUB SERPL-MCNC: 0.2 MG/DL (ref 0.2–1.2)
BUN SERPL-MCNC: 19 MG/DL (ref 8–23)
BUN/CREAT SERPL: 17.8 (ref 7–25)
C PEPTIDE SERPL-MCNC: 7.4 NG/ML (ref 1.1–4.4)
CALCIUM SERPL-MCNC: 9.6 MG/DL (ref 8.6–10.5)
CHLORIDE SERPL-SCNC: 100 MMOL/L (ref 98–107)
CHOLEST SERPL-MCNC: 125 MG/DL (ref 0–200)
CO2 SERPL-SCNC: 26.5 MMOL/L (ref 22–29)
CREAT SERPL-MCNC: 1.07 MG/DL (ref 0.57–1)
GLOBULIN SER CALC-MCNC: 2.6 GM/DL
GLUCOSE SERPL-MCNC: 232 MG/DL (ref 65–99)
HBA1C MFR BLD: 8 % (ref 4.8–5.6)
HDLC SERPL-MCNC: 49 MG/DL (ref 40–60)
INTERPRETATION: NORMAL
INTERPRETATION: NORMAL
LDLC SERPL CALC-MCNC: 56 MG/DL (ref 0–100)
Lab: NORMAL
Lab: NORMAL
MICROALBUMIN UR-MCNC: 43.5 UG/ML
POTASSIUM SERPL-SCNC: 4.5 MMOL/L (ref 3.5–5.2)
PROT SERPL-MCNC: 6.9 G/DL (ref 6–8.5)
SODIUM SERPL-SCNC: 140 MMOL/L (ref 136–145)
T4 SERPL-MCNC: 5.69 MCG/DL (ref 4.5–11.7)
TRIGL SERPL-MCNC: 101 MG/DL (ref 0–150)
TSH SERPL DL<=0.005 MIU/L-ACNC: 1.58 MIU/ML (ref 0.27–4.2)
URATE SERPL-MCNC: 7.2 MG/DL (ref 2.4–5.7)
VLDLC SERPL CALC-MCNC: 20.2 MG/DL

## 2019-07-07 DIAGNOSIS — IMO0001 UNCONTROLLED TYPE 2 DIABETES MELLITUS WITHOUT COMPLICATION, WITH LONG-TERM CURRENT USE OF INSULIN: ICD-10-CM

## 2019-07-08 ENCOUNTER — RESULTS ENCOUNTER (OUTPATIENT)
Dept: ENDOCRINOLOGY | Age: 64
End: 2019-07-08

## 2019-07-08 DIAGNOSIS — E66.01 MORBID OBESITY (HCC): ICD-10-CM

## 2019-07-08 DIAGNOSIS — Z79.4 CONTROLLED TYPE 2 DIABETES MELLITUS WITH STAGE 3 CHRONIC KIDNEY DISEASE, WITH LONG-TERM CURRENT USE OF INSULIN (HCC): ICD-10-CM

## 2019-07-08 DIAGNOSIS — E11.22 CONTROLLED TYPE 2 DIABETES MELLITUS WITH STAGE 3 CHRONIC KIDNEY DISEASE, WITH LONG-TERM CURRENT USE OF INSULIN (HCC): ICD-10-CM

## 2019-07-08 DIAGNOSIS — N18.30 CONTROLLED TYPE 2 DIABETES MELLITUS WITH STAGE 3 CHRONIC KIDNEY DISEASE, WITH LONG-TERM CURRENT USE OF INSULIN (HCC): ICD-10-CM

## 2019-07-08 DIAGNOSIS — E78.5 DYSLIPIDEMIA: ICD-10-CM

## 2019-07-08 DIAGNOSIS — I10 BENIGN HYPERTENSION: ICD-10-CM

## 2019-07-08 RX ORDER — INSULIN GLARGINE 300 U/ML
INJECTION, SOLUTION SUBCUTANEOUS
Qty: 67.5 ML | Refills: 0 | Status: SHIPPED | OUTPATIENT
Start: 2019-07-08 | End: 2019-07-19 | Stop reason: SDUPTHER

## 2019-07-08 RX ORDER — DAPAGLIFLOZIN AND METFORMIN HYDROCHLORIDE 5; 1000 MG/1; MG/1
TABLET, FILM COATED, EXTENDED RELEASE ORAL
Qty: 180 TABLET | Refills: 0 | Status: SHIPPED | OUTPATIENT
Start: 2019-07-08 | End: 2019-07-19 | Stop reason: SDUPTHER

## 2019-07-19 ENCOUNTER — OFFICE VISIT (OUTPATIENT)
Dept: ENDOCRINOLOGY | Age: 64
End: 2019-07-19

## 2019-07-19 VITALS
SYSTOLIC BLOOD PRESSURE: 126 MMHG | BODY MASS INDEX: 48.82 KG/M2 | WEIGHT: 293 LBS | DIASTOLIC BLOOD PRESSURE: 70 MMHG | HEIGHT: 65 IN | RESPIRATION RATE: 16 BRPM

## 2019-07-19 DIAGNOSIS — E66.01 MORBID OBESITY (HCC): ICD-10-CM

## 2019-07-19 DIAGNOSIS — Z79.4 CONTROLLED TYPE 2 DIABETES MELLITUS WITH COMPLICATION, WITH LONG-TERM CURRENT USE OF INSULIN (HCC): Primary | ICD-10-CM

## 2019-07-19 DIAGNOSIS — IMO0001 UNCONTROLLED TYPE 2 DIABETES MELLITUS WITHOUT COMPLICATION, WITH LONG-TERM CURRENT USE OF INSULIN: ICD-10-CM

## 2019-07-19 DIAGNOSIS — IMO0002 UNCONTROLLED TYPE 2 DIABETES MELLITUS: ICD-10-CM

## 2019-07-19 DIAGNOSIS — E78.5 DYSLIPIDEMIA: ICD-10-CM

## 2019-07-19 DIAGNOSIS — E66.01 MORBID OBESITY DUE TO EXCESS CALORIES (HCC): ICD-10-CM

## 2019-07-19 DIAGNOSIS — I10 BENIGN HYPERTENSION: ICD-10-CM

## 2019-07-19 DIAGNOSIS — E11.8 CONTROLLED TYPE 2 DIABETES MELLITUS WITH COMPLICATION, WITH LONG-TERM CURRENT USE OF INSULIN (HCC): Primary | ICD-10-CM

## 2019-07-19 PROCEDURE — 99214 OFFICE O/P EST MOD 30 MIN: CPT | Performed by: INTERNAL MEDICINE

## 2019-07-19 RX ORDER — ROSUVASTATIN CALCIUM 20 MG/1
20 TABLET, COATED ORAL DAILY
Qty: 90 TABLET | Refills: 3 | Status: SHIPPED | OUTPATIENT
Start: 2019-07-19 | End: 2019-08-02 | Stop reason: SDUPTHER

## 2019-07-19 RX ORDER — PEN NEEDLE, DIABETIC 32GX 5/32"
NEEDLE, DISPOSABLE MISCELLANEOUS
Qty: 100 EACH | Refills: 3 | Status: SHIPPED | OUTPATIENT
Start: 2019-07-19 | End: 2019-08-02 | Stop reason: SDUPTHER

## 2019-07-19 RX ORDER — INSULIN GLARGINE 300 U/ML
225 INJECTION, SOLUTION SUBCUTANEOUS
Qty: 24 PEN | Refills: 3 | Status: SHIPPED | OUTPATIENT
Start: 2019-07-19 | End: 2019-08-02 | Stop reason: SDUPTHER

## 2019-07-19 RX ORDER — EXENATIDE 2 MG/.65ML
INJECTION, SUSPENSION, EXTENDED RELEASE SUBCUTANEOUS
Qty: 12 PEN | Refills: 3 | Status: SHIPPED | OUTPATIENT
Start: 2019-07-19 | End: 2019-08-01 | Stop reason: SDUPTHER

## 2019-07-19 RX ORDER — ALLOPURINOL 100 MG/1
100 TABLET ORAL DAILY
Qty: 90 TABLET | Refills: 3 | Status: SHIPPED | OUTPATIENT
Start: 2019-07-19 | End: 2019-08-12 | Stop reason: SDUPTHER

## 2019-07-19 RX ORDER — DAPAGLIFLOZIN AND METFORMIN HYDROCHLORIDE 5; 1000 MG/1; MG/1
2 TABLET, FILM COATED, EXTENDED RELEASE ORAL DAILY
Qty: 180 TABLET | Refills: 3 | Status: SHIPPED | OUTPATIENT
Start: 2019-07-19 | End: 2019-08-02 | Stop reason: SDUPTHER

## 2019-07-19 NOTE — PROGRESS NOTES
"Subjective   Ria Chan is a 63 y.o. female seen for follow up for DM2, hyperlipidemia, HTN, lab review. Patient is checking BG once daily. She states that she had cataract surgery in 10/2018 and having problems with her eyes since then.     History of Present Illness this is a 63-year-old female known patient with type 2 diabetes hypertension and dyslipidemia with vitamin D deficiency and morbid obesity.  Over the course of last 6 months she has had no significant health problem for which to go to the ER or hospital.    /70   Resp 16   Ht 165.1 cm (65\")   Wt (!) 137 kg (302 lb 6.4 oz)   BMI 50.32 kg/m²      Allergies   Allergen Reactions   • Aliskiren Other (See Comments)     Causes patient to go into a coma   • Timolol Maleate Dizziness, Palpitations, Photosensitivity and Shortness Of Breath   • Ace Inhibitors Angioedema   • Diclofenac Angioedema   • Sulfamethoxazole-Trimethoprim Nausea Only       Current Outpatient Medications:   •  amLODIPine (NORVASC) 10 MG tablet, TAKE 1 TABLET DAILY, Disp: 90 tablet, Rfl: 3  •  aspirin 81 MG tablet, Take 81 mg by mouth daily., Disp: , Rfl:   •  BD PEN NEEDLE SERVANDO U/F 32G X 4 MM misc, 4 (four) times a day., Disp: , Rfl:   •  BYDUREON 2 MG pen-injector injection, INJECT 2 MG UNDER THE SKIN ONE TIME PER WEEK, Disp: 12 pen, Rfl: 0  •  Calcium-Vitamin D-Vitamin K (VIACTIV PO), Take 1 tablet by mouth., Disp: , Rfl:   •  glucose blood (ONE TOUCH ULTRA TEST) test strip, Use to test BG 5 times daily, Disp: 500 each, Rfl: 0  •  irbesartan (AVAPRO) 75 MG tablet, TAKE 1 TABLET DAILY, Disp: 90 tablet, Rfl: 0  •  Multiple Vitamins-Minerals (MULTIVITAMIN ADULT PO), Take 1 tablet by mouth daily., Disp: , Rfl:   •  ONETOUCH DELICA LANCETS 33G misc, Use to test BG 5 times daily, Disp: 500 each, Rfl: 0  •  rosuvastatin (CRESTOR) 20 MG tablet, Take 1 tablet by mouth Daily., Disp: 90 tablet, Rfl: 3  •  TOUJEO SOLOSTAR 300 UNIT/ML solution pen-injector, INJECT 225 UNITS UNDER THE " SKIN DAILY, Disp: 67.5 mL, Rfl: 0  •  triamterene-hydrochlorothiazide (MAXZIDE-25) 37.5-25 MG per tablet, TAKE 1 TABLET DAILY, Disp: 90 tablet, Rfl: 3  •  XIGDUO XR 5-1000 MG tablet, TAKE 2 TABLETS DAILY, Disp: 180 tablet, Rfl: 0      The following portions of the patient's history were reviewed and updated as appropriate: allergies, current medications, past family history, past medical history, past social history, past surgical history and problem list.    Review of Systems   Constitutional: Negative.    HENT: Negative.    Eyes: Positive for visual disturbance.   Respiratory: Negative.    Cardiovascular: Negative.  Negative for chest pain.   Gastrointestinal: Negative.    Endocrine: Negative.  Negative for polyphagia and polyuria.   Genitourinary: Negative.    Musculoskeletal: Negative.    Skin: Negative.  Negative for pallor.   Allergic/Immunologic: Negative.    Neurological: Negative.  Negative for dizziness, tremors, seizures, speech difficulty, weakness and headaches.   Hematological: Negative.    Psychiatric/Behavioral: Negative.  Negative for confusion. The patient is not nervous/anxious.        Objective   Physical Exam   Constitutional: She is oriented to person, place, and time. She appears well-developed and well-nourished. No distress.   Morbid obesity   HENT:   Head: Normocephalic and atraumatic.   Right Ear: External ear normal.   Left Ear: External ear normal.   Nose: Nose normal.   Mouth/Throat: Oropharynx is clear and moist. No oropharyngeal exudate.   Eyes: Conjunctivae and EOM are normal. Pupils are equal, round, and reactive to light. Right eye exhibits no discharge. Left eye exhibits no discharge. No scleral icterus.   Neck: Normal range of motion. Neck supple. No JVD present. No tracheal deviation present. No thyromegaly present.   Cardiovascular: Normal rate, regular rhythm, normal heart sounds and intact distal pulses. Exam reveals no gallop and no friction rub.   No murmur  heard.  Pulmonary/Chest: Effort normal and breath sounds normal. No stridor. No respiratory distress. She has no wheezes. She has no rales. She exhibits no tenderness.   Abdominal: Soft. Bowel sounds are normal. She exhibits no distension and no mass. There is no tenderness. There is no rebound and no guarding. No hernia.   Musculoskeletal: Normal range of motion. She exhibits no edema, tenderness or deformity.   Lymphadenopathy:     She has no cervical adenopathy.   Neurological: She is alert and oriented to person, place, and time. She has normal reflexes. She displays normal reflexes. No cranial nerve deficit or sensory deficit. She exhibits normal muscle tone. Coordination normal.   Skin: Skin is warm and dry. No rash noted. She is not diaphoretic. No erythema. No pallor.   Acanthosis nigricans around her neck   Psychiatric: She has a normal mood and affect. Her behavior is normal. Judgment and thought content normal.   Nursing note and vitals reviewed.       Results for orders placed or performed in visit on 07/08/19   T4 & TSH ([facility])   Result Value Ref Range    TSH 1.580 0.270 - 4.200 mIU/mL    T4, Total 5.69 4.50 - 11.70 mcg/dL   Uric Acid   Result Value Ref Range    Uric Acid 7.2 (H) 2.4 - 5.7 mg/dL   Vitamin D 25 Hydroxy   Result Value Ref Range    25 Hydroxy, Vitamin D 43.7 30.0 - 100.0 ng/ml   Comprehensive Metabolic Panel   Result Value Ref Range    Glucose 232 (H) 65 - 99 mg/dL    BUN 19 8 - 23 mg/dL    Creatinine 1.07 (H) 0.57 - 1.00 mg/dL    eGFR Non African Am 52 (L) >60 mL/min/1.73    eGFR African Am 63 >60 mL/min/1.73    BUN/Creatinine Ratio 17.8 7.0 - 25.0    Sodium 140 136 - 145 mmol/L    Potassium 4.5 3.5 - 5.2 mmol/L    Chloride 100 98 - 107 mmol/L    Total CO2 26.5 22.0 - 29.0 mmol/L    Calcium 9.6 8.6 - 10.5 mg/dL    Total Protein 6.9 6.0 - 8.5 g/dL    Albumin 4.30 3.50 - 5.20 g/dL    Globulin 2.6 gm/dL    A/G Ratio 1.7 g/dL    Total Bilirubin 0.2 0.2 - 1.2 mg/dL    Alkaline Phosphatase 79  39 - 117 U/L    AST (SGOT) 14 1 - 32 U/L    ALT (SGPT) 14 1 - 33 U/L   C-Peptide   Result Value Ref Range    C-Peptide 7.4 (H) 1.1 - 4.4 ng/mL   Hemoglobin A1c   Result Value Ref Range    Hemoglobin A1C 8.00 (H) 4.80 - 5.60 %   Lipid Panel   Result Value Ref Range    Total Cholesterol 125 0 - 200 mg/dL    Triglycerides 101 0 - 150 mg/dL    HDL Cholesterol 49 40 - 60 mg/dL    VLDL Cholesterol 20.2 mg/dL    LDL Cholesterol  56 0 - 100 mg/dL   [facility] CKD Program   Result Value Ref Range    Interpretation Note    MicroAlbumin, Urine, Random -   Result Value Ref Range    Microalbumin, Urine 43.5 Not Estab. ug/mL   Cardiovascular Risk Assessment   Result Value Ref Range    Interpretation Note     PDF Image Not applicable    Diabetes Patient Education   Result Value Ref Range    PDF Image Not applicable          Assessment/Plan   Diagnoses and all orders for this visit:    Controlled type 2 diabetes mellitus with complication, with long-term current use of insulin (CMS/[facility])  -     T4 & TSH ([facility]); Future  -     Uric Acid; Future  -     Vitamin D 25 Hydroxy; Future  -     Comprehensive Metabolic Panel; Future  -     C-Peptide; Future  -     Hemoglobin A1c; Future  -     Lipid Panel; Future  -     MicroAlbumin, Urine, Random - Urine, Clean Catch; Future    Benign hypertension  -     T4 & TSH ([facility]); Future  -     Uric Acid; Future  -     Vitamin D 25 Hydroxy; Future  -     Comprehensive Metabolic Panel; Future  -     C-Peptide; Future  -     Hemoglobin A1c; Future  -     Lipid Panel; Future  -     MicroAlbumin, Urine, Random - Urine, Clean Catch; Future    Morbid obesity (CMS/[facility])  -     T4 & TSH ([facility]); Future  -     Uric Acid; Future  -     Vitamin D 25 Hydroxy; Future  -     Comprehensive Metabolic Panel; Future  -     C-Peptide; Future  -     Hemoglobin A1c; Future  -     Lipid Panel; Future  -     MicroAlbumin, Urine, Random - Urine, Clean Catch; Future    Dyslipidemia  -     T4 & TSH ([facility]); Future  -      Uric Acid; Future  -     Vitamin D 25 Hydroxy; Future  -     Comprehensive Metabolic Panel; Future  -     C-Peptide; Future  -     Hemoglobin A1c; Future  -     Lipid Panel; Future  -     MicroAlbumin, Urine, Random - Urine, Clean Catch; Future    Uncontrolled type 2 diabetes mellitus without complication, with long-term current use of insulin (CMS/[facility])  -     TOUJEO SOLOSTAR 300 UNIT/ML solution pen-injector; Inject 225 Units under the skin into the appropriate area as directed Daily With Breakfast.  -     T4 & TSH ([facility]); Future  -     Uric Acid; Future  -     Vitamin D 25 Hydroxy; Future  -     Comprehensive Metabolic Panel; Future  -     C-Peptide; Future  -     Hemoglobin A1c; Future  -     Lipid Panel; Future  -     MicroAlbumin, Urine, Random - Urine, Clean Catch; Future    Morbid obesity due to excess calories (CMS/[facility])  -     BD PEN NEEDLE SERVANDO U/F 32G X 4 MM misc; 1 daily for injection of Toujeo    Uncontrolled type 2 diabetes mellitus (CMS/[facility])  -     BD PEN NEEDLE SERVANDO U/F 32G X 4 MM misc; 1 daily for injection of Toujeo    Other orders  -     XIGDUO XR 5-1000 MG tablet; Take 2 tablets by mouth Daily.  -     rosuvastatin (CRESTOR) 20 MG tablet; Take 1 tablet by mouth Daily.  -     BYDUREON 2 MG pen-injector injection; 2 mg weekly.  -     allopurinol (ZYLOPRIM) 100 MG tablet; Take 1 tablet by mouth Daily.      Is summary I saw and examined this 63-year-old female for above-mentioned problems.  I reviewed her laboratory evaluation of 7/5/2019 and provided her with a hard copy of it.  Her hemoglobin A1c is 8.00 otherwise she is clinically and metabolically stable.  I encouraged her to watch her diet and increase her physical activity.  Also her uric acid is elevated and for that I am starting her on allopurinol 100 mg daily.  She will see Ms. Frances Ball in 4 months or sooner if needed with laboratory evaluation prior to each office visit.

## 2019-07-30 DIAGNOSIS — I10 ESSENTIAL HYPERTENSION: ICD-10-CM

## 2019-07-30 DIAGNOSIS — E66.01 MORBID OBESITY DUE TO EXCESS CALORIES (HCC): ICD-10-CM

## 2019-07-30 RX ORDER — AMLODIPINE BESYLATE 10 MG/1
TABLET ORAL
Qty: 90 TABLET | Refills: 3 | Status: SHIPPED | OUTPATIENT
Start: 2019-07-30 | End: 2020-01-15 | Stop reason: SDUPTHER

## 2019-08-01 RX ORDER — EXENATIDE 2 MG/.65ML
INJECTION, SUSPENSION, EXTENDED RELEASE SUBCUTANEOUS
Qty: 12 PEN | Refills: 1 | Status: SHIPPED | OUTPATIENT
Start: 2019-08-01 | End: 2019-08-02 | Stop reason: SDUPTHER

## 2019-08-01 RX ORDER — EXENATIDE 2 MG/.65ML
INJECTION, SUSPENSION, EXTENDED RELEASE SUBCUTANEOUS
Refills: 4 | OUTPATIENT
Start: 2019-08-01

## 2019-08-02 ENCOUNTER — TELEPHONE (OUTPATIENT)
Dept: ENDOCRINOLOGY | Age: 64
End: 2019-08-02

## 2019-08-02 DIAGNOSIS — IMO0001 UNCONTROLLED TYPE 2 DIABETES MELLITUS WITHOUT COMPLICATION, WITH LONG-TERM CURRENT USE OF INSULIN: ICD-10-CM

## 2019-08-02 DIAGNOSIS — IMO0002 UNCONTROLLED TYPE 2 DIABETES MELLITUS: ICD-10-CM

## 2019-08-02 DIAGNOSIS — E66.01 MORBID OBESITY DUE TO EXCESS CALORIES (HCC): ICD-10-CM

## 2019-08-02 RX ORDER — ROSUVASTATIN CALCIUM 20 MG/1
20 TABLET, COATED ORAL DAILY
Qty: 90 TABLET | Refills: 3 | Status: SHIPPED | OUTPATIENT
Start: 2019-08-02 | End: 2019-08-12 | Stop reason: SDUPTHER

## 2019-08-02 RX ORDER — EXENATIDE 2 MG/.65ML
INJECTION, SUSPENSION, EXTENDED RELEASE SUBCUTANEOUS
Qty: 12 PEN | Refills: 1 | Status: SHIPPED | OUTPATIENT
Start: 2019-08-02 | End: 2020-01-28 | Stop reason: SDUPTHER

## 2019-08-02 RX ORDER — INSULIN GLARGINE 300 U/ML
225 INJECTION, SOLUTION SUBCUTANEOUS
Qty: 24 PEN | Refills: 3 | Status: SHIPPED | OUTPATIENT
Start: 2019-08-02 | End: 2019-09-30 | Stop reason: SDUPTHER

## 2019-08-02 RX ORDER — DAPAGLIFLOZIN AND METFORMIN HYDROCHLORIDE 5; 1000 MG/1; MG/1
2 TABLET, FILM COATED, EXTENDED RELEASE ORAL DAILY
Qty: 180 TABLET | Refills: 3 | Status: SHIPPED | OUTPATIENT
Start: 2019-08-02 | End: 2020-01-15 | Stop reason: SDUPTHER

## 2019-08-02 RX ORDER — EXENATIDE 2 MG/.65ML
INJECTION, SUSPENSION, EXTENDED RELEASE SUBCUTANEOUS
Qty: 12 PEN | Refills: 1 | Status: SHIPPED | OUTPATIENT
Start: 2019-08-02 | End: 2019-08-02

## 2019-08-02 RX ORDER — ROSUVASTATIN CALCIUM 20 MG/1
20 TABLET, COATED ORAL DAILY
Qty: 90 TABLET | Refills: 3 | Status: SHIPPED | OUTPATIENT
Start: 2019-08-02 | End: 2019-08-02

## 2019-08-02 RX ORDER — PEN NEEDLE, DIABETIC 32GX 5/32"
NEEDLE, DISPOSABLE MISCELLANEOUS
Qty: 100 EACH | Refills: 3 | Status: SHIPPED | OUTPATIENT
Start: 2019-08-02 | End: 2020-01-28 | Stop reason: SDUPTHER

## 2019-08-02 RX ORDER — PEN NEEDLE, DIABETIC 32GX 5/32"
NEEDLE, DISPOSABLE MISCELLANEOUS
Qty: 100 EACH | Refills: 3 | Status: SHIPPED | OUTPATIENT
Start: 2019-08-02 | End: 2019-08-02

## 2019-08-02 RX ORDER — INSULIN GLARGINE 300 U/ML
225 INJECTION, SOLUTION SUBCUTANEOUS
Qty: 24 PEN | Refills: 3 | Status: SHIPPED | OUTPATIENT
Start: 2019-08-02 | End: 2019-08-02

## 2019-08-02 RX ORDER — DAPAGLIFLOZIN AND METFORMIN HYDROCHLORIDE 5; 1000 MG/1; MG/1
2 TABLET, FILM COATED, EXTENDED RELEASE ORAL DAILY
Qty: 180 TABLET | Refills: 3 | Status: SHIPPED | OUTPATIENT
Start: 2019-08-02 | End: 2019-08-02

## 2019-08-02 NOTE — TELEPHONE ENCOUNTER
rx's have been resent    ----- Message from Karrie Wong sent at 8/2/2019 10:19 AM EDT -----  Contact: patient  Patient said all of her medications should be going to [facility]. She requested the script for Bydureon that was sent to [facility] yesterday to be sent to [facility].    She said she picked up the Allopurinol script from [facility] that was sent on 7-19-19.    She said she did not  scripts that were sent to [facility] on 7-19-19 for BD pen needles, rosuvastatin, Toujeo and Xigduo and they should have been sent to [facility] and she is asking for those scripts to be resent to [facility].     Pt.- [phone]

## 2019-08-12 RX ORDER — ROSUVASTATIN CALCIUM 20 MG/1
20 TABLET, COATED ORAL DAILY
Qty: 90 TABLET | Refills: 3 | Status: SHIPPED | OUTPATIENT
Start: 2019-08-12 | End: 2020-01-15 | Stop reason: SDUPTHER

## 2019-08-12 RX ORDER — ALLOPURINOL 100 MG/1
100 TABLET ORAL DAILY
Qty: 90 TABLET | Refills: 3 | Status: SHIPPED | OUTPATIENT
Start: 2019-08-12 | End: 2020-01-15 | Stop reason: SDUPTHER

## 2019-09-05 ENCOUNTER — TELEPHONE (OUTPATIENT)
Dept: FAMILY MEDICINE CLINIC | Facility: CLINIC | Age: 64
End: 2019-09-05

## 2019-09-05 NOTE — TELEPHONE ENCOUNTER
Pt called, experiencing pain in back and bottom, and lt hand. Pt is currently being treated by Dr.Kittie العراقي. Since pt already sees ortho, I advised her to call her ortho and explain symptoms. Pt did so and called back she was informed to go to the ER.

## 2019-09-16 RX ORDER — IRBESARTAN 75 MG/1
TABLET ORAL
Qty: 90 TABLET | Refills: 4 | Status: SHIPPED | OUTPATIENT
Start: 2019-09-16 | End: 2020-01-15 | Stop reason: SDUPTHER

## 2019-09-30 ENCOUNTER — TELEPHONE (OUTPATIENT)
Dept: ENDOCRINOLOGY | Age: 64
End: 2019-09-30

## 2019-09-30 DIAGNOSIS — IMO0001 UNCONTROLLED TYPE 2 DIABETES MELLITUS WITHOUT COMPLICATION, WITH LONG-TERM CURRENT USE OF INSULIN: ICD-10-CM

## 2019-09-30 RX ORDER — INSULIN GLARGINE 300 U/ML
225 INJECTION, SOLUTION SUBCUTANEOUS
Qty: 24 PEN | Refills: 3 | Status: SHIPPED | OUTPATIENT
Start: 2019-09-30 | End: 2020-01-13

## 2019-10-04 ENCOUNTER — OFFICE VISIT (OUTPATIENT)
Dept: FAMILY MEDICINE CLINIC | Facility: CLINIC | Age: 64
End: 2019-10-04

## 2019-10-04 VITALS
HEART RATE: 79 BPM | OXYGEN SATURATION: 99 % | HEIGHT: 65 IN | BODY MASS INDEX: 48.82 KG/M2 | WEIGHT: 293 LBS | TEMPERATURE: 98.1 F | DIASTOLIC BLOOD PRESSURE: 72 MMHG | SYSTOLIC BLOOD PRESSURE: 128 MMHG

## 2019-10-04 DIAGNOSIS — G89.29 CHRONIC RIGHT-SIDED LOW BACK PAIN WITH RIGHT-SIDED SCIATICA: ICD-10-CM

## 2019-10-04 DIAGNOSIS — M54.41 CHRONIC RIGHT-SIDED LOW BACK PAIN WITH RIGHT-SIDED SCIATICA: ICD-10-CM

## 2019-10-04 DIAGNOSIS — E79.0 ELEVATED URIC ACID IN BLOOD: ICD-10-CM

## 2019-10-04 DIAGNOSIS — M25.532 BILATERAL WRIST PAIN: Primary | ICD-10-CM

## 2019-10-04 DIAGNOSIS — M76.31 IT BAND SYNDROME, RIGHT: ICD-10-CM

## 2019-10-04 DIAGNOSIS — M25.531 BILATERAL WRIST PAIN: Primary | ICD-10-CM

## 2019-10-04 PROCEDURE — 99214 OFFICE O/P EST MOD 30 MIN: CPT | Performed by: FAMILY MEDICINE

## 2019-10-04 RX ORDER — BACLOFEN 10 MG/1
10 TABLET ORAL 3 TIMES DAILY PRN
Qty: 90 TABLET | Refills: 3 | Status: SHIPPED | OUTPATIENT
Start: 2019-10-04 | End: 2020-01-07 | Stop reason: SDUPTHER

## 2019-10-04 NOTE — PROGRESS NOTES
Ria Chan is a 63 y.o. female.     Chief Complaint   Patient presents with   • Joint Pain     generaL JOINT PAIN FOR ABOUT 3 MONTH GETTOING WORSE AND BURNING IN LEGS AT NIGHT    • Medication Problem     ALLOPIURINOL MAY GIVE HER SIDE EFFECTS       HPI     Pt is a pleasant 63 y.o. YO female here for joint pain, peripheral neuropathy, gout.      Patient with joint pain in the Bilateral wrist pain for 3 months.  She has having bilateral wrist pain that has been aching in her wrists.  Seems to have started after she starte Allopurinol.  She remembers having similar symptoms before    She with peripheral neuropathy in the bilateral hips on the lateral side, worse at night.  She does have a history of type 2 diabetes that is not well controlled.  Hemoglobin A1c is 8.  Present for 3-4 weeks, not present during the day, she has no numbness when awake but R lower back pain that radiates down to the R knee on the lateral side.  She has taken Motrin, tylenol and ibuprofen. Heating pad improves. Back pain is constant, worsens with walking.  Often laying around and not working.  She has had PT before that did not help.  This was 4-5 years ago.     Patient with hyperuricemia that has been stable.  She is currently on allopurinol 100 mg daily.  She is experiencing no symptoms of gout, never had a gout flare but has had uncontrolled elevated uric acid levels.  She does recall being on allopurinol in the past and having joint pain secondary to this.  Currently she has bilateral wrist pain and overall joint pains that she attributes to when she started allopurinol.  She has started eating a low purine diet a couple weeks ago.    The following portions of the patient's history were reviewed and updated as appropriate: allergies, current medications, past family history, past medical history, past social history, past surgical history and problem list.    Review of Systems   Constitutional: Positive for fatigue.   HENT:  Negative.    Eyes: Negative.    Respiratory: Negative.    Cardiovascular: Negative for chest pain.   Endocrine: Negative.    Genitourinary: Negative.    Musculoskeletal: Positive for arthralgias, back pain and myalgias.        LEG PAIN    Skin: Negative.    Allergic/Immunologic: Negative.    Neurological: Negative.    Hematological: Negative.    Psychiatric/Behavioral: Positive for sleep disturbance.       Objective  Vitals:    10/04/19 1125   BP: 128/72   Pulse: 79   Temp: 98.1 °F (36.7 °C)   SpO2: 99%        Physical Exam   Constitutional: She is oriented to person, place, and time. She appears well-developed and well-nourished. No distress.   HENT:   Head: Normocephalic.   Nose: Nose normal.   Eyes: EOM are normal.   Cardiovascular: Normal rate, regular rhythm, normal heart sounds and intact distal pulses.   No murmur heard.  Pulmonary/Chest: Effort normal and breath sounds normal. No respiratory distress.   Musculoskeletal: Normal range of motion.   Bilateral wrists with no tenderness to palpation of the bilateral wrist with palpation of the carpal bones, radius, ulna or metacarpals.  However she does have tenderness with flexion and extension of the wrist, mild swelling but no warmth or redness.    Lower back pain with no tenderness over the bony landmarks.  She does have muscular tenderness especially on the right mid buttock extending laterally to the knee.  Positive straight leg raise.   Neurological: She is alert and oriented to person, place, and time.   Skin: Skin is warm and dry. No rash noted.   Psychiatric: She has a normal mood and affect. Her behavior is normal. Judgment and thought content normal.   Nursing note and vitals reviewed.        Current Outpatient Medications:   •  allopurinol (ZYLOPRIM) 100 MG tablet, Take 1 tablet by mouth Daily., Disp: 90 tablet, Rfl: 3  •  amLODIPine (NORVASC) 10 MG tablet, TAKE 1 TABLET DAILY, Disp: 90 tablet, Rfl: 3  •  aspirin 81 MG tablet, Take 81 mg by mouth  daily., Disp: , Rfl:   •  BD PEN NEEDLE SERVANDO U/F 32G X 4 MM misc, 1 daily for injection of Toujeo, Disp: 100 each, Rfl: 3  •  BYDUREON 2 MG pen-injector injection, 2 mg weekly., Disp: 12 pen, Rfl: 1  •  Calcium-Vitamin D-Vitamin K (VIACTIV PO), Take 1 tablet by mouth., Disp: , Rfl:   •  glucose blood (ONE TOUCH ULTRA TEST) test strip, Use to test BG 5 times daily, Disp: 500 each, Rfl: 0  •  irbesartan (AVAPRO) 75 MG tablet, TAKE 1 TABLET DAILY, Disp: 90 tablet, Rfl: 4  •  ONETOUCH DELICA LANCETS 33G misc, Use to test BG 5 times daily, Disp: 500 each, Rfl: 0  •  rosuvastatin (CRESTOR) 20 MG tablet, Take 1 tablet by mouth Daily., Disp: 90 tablet, Rfl: 3  •  TOUJEO SOLOSTAR 300 UNIT/ML solution pen-injector injection, Inject 225 Units under the skin into the appropriate area as directed Daily With Breakfast., Disp: 24 pen, Rfl: 3  •  triamterene-hydrochlorothiazide (MAXZIDE-25) 37.5-25 MG per tablet, TAKE 1 TABLET DAILY, Disp: 90 tablet, Rfl: 3  •  XIGDUO XR 5-1000 MG tablet, Take 2 tablets by mouth Daily., Disp: 180 tablet, Rfl: 3  •  baclofen (LIORESAL) 10 MG tablet, Take 1 tablet by mouth 3 (Three) Times a Day As Needed for Muscle Spasms., Disp: 90 tablet, Rfl: 3  •  Multiple Vitamins-Minerals (MULTIVITAMIN ADULT PO), Take 1 tablet by mouth daily., Disp: , Rfl:     Procedures    Lab Results (most recent)     None              Ria was seen today for joint pain and medication problem.    Diagnoses and all orders for this visit:    Bilateral wrist pain  -     Rheumatoid Arthritis Expanded Panel    Elevated uric acid in blood  -     Uric acid    Chronic right-sided low back pain with right-sided sciatica  -     Ambulatory Referral to Physical Therapy Evaluate and treat  -     baclofen (LIORESAL) 10 MG tablet; Take 1 tablet by mouth 3 (Three) Times a Day As Needed for Muscle Spasms.    It band syndrome, right  -     Ambulatory Referral to Physical Therapy Evaluate and treat  -     baclofen (LIORESAL) 10 MG tablet;  Take 1 tablet by mouth 3 (Three) Times a Day As Needed for Muscle Spasms.    Pleasant 63-year-old with morbid obesity and uncontrolled diabetes with hemoglobin A1c last 8 followed by endocrinology here for multiple joint pain issues and numbness on the right hip.  I think the back pain and numbness are related, will not do steroids as her diabetes has not been well controlled.  Start with baclofen and physical therapy.  Follow-up in 6 weeks.  Discussed back health and avoiding being sedentary.  I also advised her to start walking in a pool.  She is aware that obesity has made these issues worse, she is starting to make some dietary changes especially with a low purine diet.    Patient with bilateral wrist pain that she attributes to allopurinol.  States that she has had this response before.  She is aware this is very uncommon and unlikely.  As she is starting to make dietary changes for low purine diet okay to hold allopurinol for 6 weeks to see if this improves her wrist pain.  I will reevaluate her after that.  Testing above for other causes of wrist pain especially with rheumatoid arthritis.  Continue follow-up with endocrinology as she has been.      Return in about 6 weeks (around 11/15/2019), or if symptoms worsen or fail to improve, for Recheck back pain and joint pain, will need an Annual .      Sharonda Rodgers MD

## 2019-10-06 DIAGNOSIS — IMO0001 UNCONTROLLED TYPE 2 DIABETES MELLITUS WITHOUT COMPLICATION, WITH LONG-TERM CURRENT USE OF INSULIN: ICD-10-CM

## 2019-10-06 LAB
CCP IGA+IGG SERPL IA-ACNC: >250 UNITS (ref 0–19)
CRP SERPL-MCNC: 23 MG/L (ref 0–10)
ERYTHROCYTE [SEDIMENTATION RATE] IN BLOOD BY WESTERGREN METHOD: 40 MM/HR (ref 0–40)
RHEUMATOID FACT SERPL-ACNC: 37.7 IU/ML (ref 0–13.9)
URATE SERPL-MCNC: 6.2 MG/DL (ref 2.5–7.1)

## 2019-10-06 RX ORDER — INSULIN GLARGINE 300 U/ML
INJECTION, SOLUTION SUBCUTANEOUS
Qty: 67.5 ML | Refills: 4 | Status: SHIPPED | OUTPATIENT
Start: 2019-10-06 | End: 2019-12-17

## 2019-10-11 NOTE — PROGRESS NOTES
Please call patient back with results.  The labs has resulted as positive for rheumatoid arthritis.  Uric acid is low.  I would advise that you see a rheumatologist for further evaluation.  If the are agreeable to this referral, Kirsten please place order.  I am glad to see her in the office as well to discuss these results.    Thank you

## 2019-10-15 DIAGNOSIS — M06.9 RHEUMATOID ARTHRITIS, INVOLVING UNSPECIFIED SITE, UNSPECIFIED RHEUMATOID FACTOR PRESENCE: Primary | ICD-10-CM

## 2019-11-05 ENCOUNTER — RESULTS ENCOUNTER (OUTPATIENT)
Dept: ENDOCRINOLOGY | Age: 64
End: 2019-11-05

## 2019-11-05 DIAGNOSIS — IMO0001 UNCONTROLLED TYPE 2 DIABETES MELLITUS WITHOUT COMPLICATION, WITH LONG-TERM CURRENT USE OF INSULIN: ICD-10-CM

## 2019-11-05 DIAGNOSIS — E11.8 CONTROLLED TYPE 2 DIABETES MELLITUS WITH COMPLICATION, WITH LONG-TERM CURRENT USE OF INSULIN (HCC): ICD-10-CM

## 2019-11-05 DIAGNOSIS — I10 BENIGN HYPERTENSION: ICD-10-CM

## 2019-11-05 DIAGNOSIS — E78.5 DYSLIPIDEMIA: ICD-10-CM

## 2019-11-05 DIAGNOSIS — Z79.4 CONTROLLED TYPE 2 DIABETES MELLITUS WITH COMPLICATION, WITH LONG-TERM CURRENT USE OF INSULIN (HCC): ICD-10-CM

## 2019-11-05 DIAGNOSIS — E66.01 MORBID OBESITY (HCC): ICD-10-CM

## 2019-11-22 ENCOUNTER — OFFICE VISIT (OUTPATIENT)
Dept: FAMILY MEDICINE CLINIC | Facility: CLINIC | Age: 64
End: 2019-11-22

## 2019-11-22 VITALS
TEMPERATURE: 98.2 F | DIASTOLIC BLOOD PRESSURE: 76 MMHG | WEIGHT: 293 LBS | OXYGEN SATURATION: 99 % | HEIGHT: 65 IN | SYSTOLIC BLOOD PRESSURE: 128 MMHG | BODY MASS INDEX: 48.82 KG/M2 | HEART RATE: 81 BPM

## 2019-11-22 DIAGNOSIS — M10.062 ACUTE IDIOPATHIC GOUT OF LEFT KNEE: Primary | ICD-10-CM

## 2019-11-22 PROCEDURE — 96372 THER/PROPH/DIAG INJ SC/IM: CPT | Performed by: FAMILY MEDICINE

## 2019-11-22 PROCEDURE — 99214 OFFICE O/P EST MOD 30 MIN: CPT | Performed by: FAMILY MEDICINE

## 2019-11-22 RX ORDER — HYDROCODONE BITARTRATE AND ACETAMINOPHEN 5; 325 MG/1; MG/1
1 TABLET ORAL EVERY 8 HOURS PRN
Qty: 10 TABLET | Refills: 0 | Status: SHIPPED | OUTPATIENT
Start: 2019-11-22

## 2019-11-22 RX ORDER — PREDNISONE 20 MG/1
40 TABLET ORAL DAILY
Qty: 10 TABLET | Refills: 0 | Status: SHIPPED | OUTPATIENT
Start: 2019-11-22 | End: 2019-11-27

## 2019-11-22 RX ORDER — METHYLPREDNISOLONE SODIUM SUCCINATE 40 MG/ML
40 INJECTION, POWDER, LYOPHILIZED, FOR SOLUTION INTRAMUSCULAR; INTRAVENOUS ONCE
Status: COMPLETED | OUTPATIENT
Start: 2019-11-22 | End: 2019-11-22

## 2019-11-22 RX ADMIN — METHYLPREDNISOLONE SODIUM SUCCINATE 40 MG: 40 INJECTION, POWDER, LYOPHILIZED, FOR SOLUTION INTRAMUSCULAR; INTRAVENOUS at 13:29

## 2019-11-22 NOTE — PROGRESS NOTES
Ria Chan is a 63 y.o. female.     Chief Complaint   Patient presents with   • Knee Pain     right knee pain and swelling for a couple of month getting worse since yesterday       HPI     Pt is a pleasant 63 y.o. YO female here for new problem to me of Chronic R knee pain that has been gradually worsening over the last 3 months that has acutely worsened overnight,.  Now with a sharp and severe pain.  Her usual pain is severe, difficult for her to be mobile in the morning and gradually improves as she starts to move.  She has an appoint with rheumatology this Monday.  However today, she woke up with severe pain that feels like her knee is fractured.  It is sharp and in the middle of her knee.  She has had a diagnosis of gout but has come on and off of allopurinol because of difficulty with toleration and feeling that her wrist pain worsens with this.  She has not fallen, no trauma, no skin laceration.  No cause for her abrupt onset worsening of pain.    The following portions of the patient's history were reviewed and updated as appropriate: allergies, current medications, past family history, past medical history, past social history, past surgical history and problem list.    Review of Systems   Constitutional: Negative.    HENT: Negative.    Eyes: Negative.    Respiratory: Negative.    Cardiovascular: Negative.    Gastrointestinal: Negative.    Endocrine: Negative.    Musculoskeletal: Positive for arthralgias, gait problem, joint swelling and myalgias.        Right knee swelling    Skin: Negative.    Allergic/Immunologic: Negative.    Hematological: Negative.    Psychiatric/Behavioral: Negative.        Objective  Vitals:    11/22/19 1248   BP: 128/76   Pulse: 81   Temp: 98.2 °F (36.8 °C)   SpO2: 99%        Physical Exam   Constitutional: She is oriented to person, place, and time. She appears well-developed and well-nourished. No distress.   HENT:   Head: Normocephalic.   Nose: Nose normal.   Eyes: EOM  are normal. No scleral icterus.   Pulmonary/Chest: Effort normal. No respiratory distress.   Musculoskeletal: Normal range of motion.   Right knee with overall similar symmetry to the left.  It is slightly warm and slightly more edematous on the medial aspect, no erythema, no joint laxity.  Pain with ambulation or bearing weight.   Neurological: She is alert and oriented to person, place, and time.   Skin: Skin is warm and dry. No rash noted.   Psychiatric: She has a normal mood and affect. Her behavior is normal. Judgment and thought content normal.   Nursing note and vitals reviewed.        Current Outpatient Medications:   •  allopurinol (ZYLOPRIM) 100 MG tablet, Take 1 tablet by mouth Daily., Disp: 90 tablet, Rfl: 3  •  amLODIPine (NORVASC) 10 MG tablet, TAKE 1 TABLET DAILY, Disp: 90 tablet, Rfl: 3  •  aspirin 81 MG tablet, Take 81 mg by mouth daily., Disp: , Rfl:   •  baclofen (LIORESAL) 10 MG tablet, Take 1 tablet by mouth 3 (Three) Times a Day As Needed for Muscle Spasms., Disp: 90 tablet, Rfl: 3  •  BD PEN NEEDLE SERVANDO U/F 32G X 4 MM misc, 1 daily for injection of Toujeo, Disp: 100 each, Rfl: 3  •  BYDUREON 2 MG pen-injector injection, 2 mg weekly., Disp: 12 pen, Rfl: 1  •  Calcium-Vitamin D-Vitamin K (VIACTIV PO), Take 1 tablet by mouth., Disp: , Rfl:   •  glucose blood (ONE TOUCH ULTRA TEST) test strip, Use to test BG 5 times daily, Disp: 500 each, Rfl: 0  •  irbesartan (AVAPRO) 75 MG tablet, TAKE 1 TABLET DAILY, Disp: 90 tablet, Rfl: 4  •  Multiple Vitamins-Minerals (MULTIVITAMIN ADULT PO), Take 1 tablet by mouth daily., Disp: , Rfl:   •  ONETOUCH DELICA LANCETS 33G misc, Use to test BG 5 times daily, Disp: 500 each, Rfl: 0  •  rosuvastatin (CRESTOR) 20 MG tablet, Take 1 tablet by mouth Daily., Disp: 90 tablet, Rfl: 3  •  TOUJEO SOLOSTAR 300 UNIT/ML solution pen-injector injection, Inject 225 Units under the skin into the appropriate area as directed Daily With Breakfast., Disp: 24 pen, Rfl: 3  •  TOUJEO  SOLOSTAR 300 UNIT/ML solution pen-injector injection, INJECT 225 UNITS UNDER THE SKIN DAILY, Disp: 67.5 mL, Rfl: 4  •  triamterene-hydrochlorothiazide (MAXZIDE-25) 37.5-25 MG per tablet, TAKE 1 TABLET DAILY, Disp: 90 tablet, Rfl: 3  •  XIGDUO XR 5-1000 MG tablet, Take 2 tablets by mouth Daily., Disp: 180 tablet, Rfl: 3  •  HYDROcodone-acetaminophen (NORCO) 5-325 MG per tablet, Take 1 tablet by mouth Every 8 (Eight) Hours As Needed for Severe Pain ., Disp: 10 tablet, Rfl: 0  •  predniSONE (DELTASONE) 20 MG tablet, Take 2 tablets by mouth Daily for 5 days., Disp: 10 tablet, Rfl: 0    Current Facility-Administered Medications:   •  methylPREDNISolone sodium succinate (SOLU-Medrol) injection 40 mg, 40 mg, Intramuscular, Once, Sharonda Rodgers MD    Procedures    Lab Results (most recent)     None              Ria was seen today for knee pain.    Diagnoses and all orders for this visit:    Acute idiopathic gout of left knee  -     predniSONE (DELTASONE) 20 MG tablet; Take 2 tablets by mouth Daily for 5 days.  -     methylPREDNISolone sodium succinate (SOLU-Medrol) injection 40 mg  -     Uric acid  -     HYDROcodone-acetaminophen (NORCO) 5-325 MG per tablet; Take 1 tablet by mouth Every 8 (Eight) Hours As Needed for Severe Pain .      Patient with acute knee pain that I suspect is gout.  Treatment as above, she has diabetes that is insulin-dependent and I encouraged her to watch her blood sugars closely, increase water intake and eat a low glycemic index diet.  She may need to increase her short acting insulin briefly to lower her blood sugars as needed.    Follow-up on Monday with rheumatologist.  They will get uric acids levels at that time as well. Compare after initiating steroids.    Continue to FU with Endo.    Return if symptoms worsen or fail to improve.      Sharonda Rodgers MD

## 2019-11-23 LAB — URATE SERPL-MCNC: 6.8 MG/DL (ref 2.4–5.7)

## 2019-11-28 NOTE — PROGRESS NOTES
Please call patient back with results.  The uric acid has resulted as elevated, I do think that your pain in your knee could be related to gout.  Do think restarting allopurinol would likely be helpful or another medication to prevent gout flares..  Please follow-up with me to discuss prevention treatment.    Thank you

## 2019-12-02 ENCOUNTER — OFFICE VISIT (OUTPATIENT)
Dept: ORTHOPEDIC SURGERY | Facility: CLINIC | Age: 64
End: 2019-12-02

## 2019-12-02 VITALS — BODY MASS INDEX: 48.32 KG/M2 | HEIGHT: 65 IN | TEMPERATURE: 98.1 F | WEIGHT: 290 LBS

## 2019-12-02 DIAGNOSIS — M19.90 ARTHRITIS: ICD-10-CM

## 2019-12-02 DIAGNOSIS — M25.561 RIGHT KNEE PAIN, UNSPECIFIED CHRONICITY: Primary | ICD-10-CM

## 2019-12-02 PROCEDURE — 20610 DRAIN/INJ JOINT/BURSA W/O US: CPT | Performed by: ORTHOPAEDIC SURGERY

## 2019-12-02 PROCEDURE — 73562 X-RAY EXAM OF KNEE 3: CPT | Performed by: ORTHOPAEDIC SURGERY

## 2019-12-02 PROCEDURE — 99214 OFFICE O/P EST MOD 30 MIN: CPT | Performed by: ORTHOPAEDIC SURGERY

## 2019-12-02 RX ADMIN — METHYLPREDNISOLONE ACETATE 80 MG: 80 INJECTION, SUSPENSION INTRA-ARTICULAR; INTRALESIONAL; INTRAMUSCULAR; SOFT TISSUE at 16:14

## 2019-12-02 NOTE — PROGRESS NOTES
New Right Knee      Patient: Ria Chan        YOB: 1955    Medical Record Number: [id number]        Chief Complaints: Right knee pain      History of Present Illness: This is a 63-year-old female who presents complaining of right knee pain that began in July symptoms are severe intermittent stabbing aching burning with clicking swelling worse with standing sitting walking better with ice rest and injections no real history injury change in activity she is currently using a cane for ambulation she is retired her past medical history is remarkable for obesity diabetes cholelithiasis hypertension hyperlipidemia        Allergies:   Allergies   Allergen Reactions   • Aliskiren Other (See Comments)     Causes patient to go into a coma   • Timolol Maleate Dizziness, Palpitations, Photosensitivity and Shortness Of Breath   • Ace Inhibitors Angioedema   • Diclofenac Angioedema   • Sulfamethoxazole-Trimethoprim Nausea Only       Medications:   Home Medications:  Current Outpatient Medications on File Prior to Visit   Medication Sig   • amLODIPine (NORVASC) 10 MG tablet TAKE 1 TABLET DAILY   • aspirin 81 MG tablet Take 81 mg by mouth daily.   • BYDUREON 2 MG pen-injector injection 2 mg weekly.   • glucose blood (ONE TOUCH ULTRA TEST) test strip Use to test BG 5 times daily   • irbesartan (AVAPRO) 75 MG tablet TAKE 1 TABLET DAILY   • Multiple Vitamins-Minerals (MULTIVITAMIN ADULT PO) Take 1 tablet by mouth daily.   • ONETOUCH DELICA LANCETS 33G misc Use to test BG 5 times daily   • rosuvastatin (CRESTOR) 20 MG tablet Take 1 tablet by mouth Daily.   • TOUJEO SOLOSTAR 300 UNIT/ML solution pen-injector injection Inject 225 Units under the skin into the appropriate area as directed Daily With Breakfast.   • triamterene-hydrochlorothiazide (MAXZIDE-25) 37.5-25 MG per tablet TAKE 1 TABLET DAILY   • allopurinol (ZYLOPRIM) 100 MG tablet Take 1 tablet by mouth Daily.   • baclofen (LIORESAL) 10 MG tablet Take 1  tablet by mouth 3 (Three) Times a Day As Needed for Muscle Spasms.   • BD PEN NEEDLE SERVANDO U/F 32G X 4 MM misc 1 daily for injection of Toujeo   • Calcium-Vitamin D-Vitamin K (VIACTIV PO) Take 1 tablet by mouth.   • HYDROcodone-acetaminophen (NORCO) 5-325 MG per tablet Take 1 tablet by mouth Every 8 (Eight) Hours As Needed for Severe Pain .   • TOUJEO SOLOSTAR 300 UNIT/ML solution pen-injector injection INJECT 225 UNITS UNDER THE SKIN DAILY   • XIGDUO XR 5-1000 MG tablet Take 2 tablets by mouth Daily.     No current facility-administered medications on file prior to visit.      Current Medications:  Scheduled Meds:  Continuous Infusions:  No current facility-administered medications for this visit.   PRN Meds:.    Past Medical History:   Diagnosis Date   • Cholelithiasis     GB removed 6/2016   • Diabetes mellitus (CMS/HCC)    • Hyperlipidemia    • Hypertension    • Type 2 diabetes mellitus (CMS/HCC)         Past Surgical History:   Procedure Laterality Date   • CARDIAC CATHETERIZATION N/A 1/25/2019    Procedure: Coronary angiography;  Surgeon: Milla Daniel MD;  Location: [facility] INVASIVE LOCATION;  Service: Cardiology   • CARDIAC CATHETERIZATION N/A 1/25/2019    Procedure: Left Heart Cath;  Surgeon: Milla Daniel MD;  Location: [facility] INVASIVE LOCATION;  Service: Cardiology   • CARDIAC CATHETERIZATION N/A 1/25/2019    Procedure: Left ventriculography;  Surgeon: Milla Daniel MD;  Location: [facility] CATH INVASIVE LOCATION;  Service: Cardiology   • CHOLECYSTECTOMY WITH INTRAOPERATIVE CHOLANGIOGRAM N/A 6/4/2016    Procedure: CHOLECYSTECTOMY LAPAROSCOPIC INTRAOPERATIVE CHOLANGIOGRAM;  Surgeon: Luis Snyder MD;  Location: [facility] OR;  Service:    • HYSTEROSCOPY     • POLYPECTOMY     • TUBAL ABDOMINAL LIGATION          Social History     Occupational History   • Not on file   Tobacco Use   • Smoking status: Never Smoker   • Smokeless tobacco: Never Used   Substance and Sexual Activity   •  "Alcohol use: Yes     Alcohol/week: 0.6 oz     Types: 1 Glasses of wine per week     Comment: socially /SELDOM   • Drug use: No   • Sexual activity: Yes     Partners: Male      Social History     Social History Narrative   • Not on file        Family History   Problem Relation Age of Onset   • Diabetes Father    • Hypertension Father    • Kidney disease Father    • Uterine cancer Mother         metastatic    • Uterine cancer Maternal Grandmother    • Osteoarthritis Sister    • Breast cancer Neg Hx    • Colon cancer Neg Hx    • Heart attack Neg Hx    • Stroke Neg Hx              Review of Systems: 14 point review of systems are remarkable for pertinent positives listed in the chart by the patient the remainder negative    Review of Systems      Physical Exam: 63 y.o. female  General Appearance:    Alert, cooperative, in no acute distress                   Vitals:    12/02/19 1540   Temp: 98.1 °F (36.7 °C)   Weight: 132 kg (290 lb)   Height: 165.1 cm (65\")      Patient is alert and read ×3 no acute distress appears her above-listed at height weight and age.  Affect is normal respiratory rate is normal unlabored. Heart rate regular rate rhythm, sclera, dentition and hearing are normal for the purpose of this exam.        Ortho Exam Physical exam of the right knee reveals no effusion, no erythema.  It mild loss of extension and full flexion  Patient has mild varus alignment.  They have mild tenderness to palpation about the medial compartment, no tenderness laterally..  The patient has a negative bounce home, negative Marquis and a stable ligamentous exam.  Quad tone is reasonable and symmetric.  There are no overlying skin changes no lymphedema no lymphadenopathy.  There is good hip range of motion which is full symmetric and asymptomatic and a normal ankle exam.      Large Joint Arthrocentesis: R knee  Date/Time: 12/2/2019 4:14 PM  Consent given by: patient  Site marked: site marked  Timeout: Immediately prior to " procedure a time out was called to verify the correct patient, procedure, equipment, support staff and site/side marked as required   Supporting Documentation  Indications: pain   Procedure Details  Location: knee - R knee  Preparation: Patient was prepped and draped in the usual sterile fashion  Needle size: 22 G  Approach: anteromedial  Medications administered: 4 mL lidocaine (cardiac); 80 mg methylPREDNISolone acetate 80 MG/ML  Patient tolerance: patient tolerated the procedure well with no immediate complications                   Radiology:   AP, Lateral and merchant views of the right knee  were ordered/reviewed to evauateknee pain.  Have no compared to films she does have tricompartmental OA medial compartment is worse but also has significant lateral compartment and severe patellofemoral OA no acute pathology she is also found to have similar findings but not quite as severe on the left knee  Imaging Results (Most Recent)     Procedure Component Value Units Date/Time    XR Knee 3 View Right [[id number]] Resulted:  12/02/19 1533     Updated:  12/02/19 1533    Impression:       Ordering physician's impression is located in the Encounter Note dated 12/02/19. X-ray performed in the DR room.          Assessment/Plan:  Severe right knee pain this is degenerative in origin think an injection will calm this down which she is agreeable to do.  I did tell her that at some point in her life she would want and need a new knee she needs to get her BMI down to 40 before she would really be a candidate for that.  We will start her into some physical therapy and I will have her see Dr. Stark

## 2019-12-03 RX ORDER — METHYLPREDNISOLONE ACETATE 80 MG/ML
80 INJECTION, SUSPENSION INTRA-ARTICULAR; INTRALESIONAL; INTRAMUSCULAR; SOFT TISSUE
Status: COMPLETED | OUTPATIENT
Start: 2019-12-02 | End: 2019-12-02

## 2019-12-17 ENCOUNTER — OFFICE VISIT (OUTPATIENT)
Dept: FAMILY MEDICINE CLINIC | Facility: CLINIC | Age: 64
End: 2019-12-17

## 2019-12-17 VITALS
BODY MASS INDEX: 48.82 KG/M2 | TEMPERATURE: 97.7 F | SYSTOLIC BLOOD PRESSURE: 132 MMHG | WEIGHT: 293 LBS | OXYGEN SATURATION: 98 % | HEART RATE: 78 BPM | DIASTOLIC BLOOD PRESSURE: 84 MMHG | HEIGHT: 65 IN

## 2019-12-17 DIAGNOSIS — M1A.0620 IDIOPATHIC CHRONIC GOUT OF LEFT KNEE WITHOUT TOPHUS: Primary | ICD-10-CM

## 2019-12-17 DIAGNOSIS — M54.50 LOW BACK PAIN RADIATING TO RIGHT LOWER EXTREMITY: ICD-10-CM

## 2019-12-17 DIAGNOSIS — M79.604 LOW BACK PAIN RADIATING TO RIGHT LOWER EXTREMITY: ICD-10-CM

## 2019-12-17 PROBLEM — M65.311 TRIGGER FINGER OF RIGHT THUMB: Status: ACTIVE | Noted: 2019-07-01

## 2019-12-17 PROBLEM — M25.561 KNEE PAIN, RIGHT: Status: ACTIVE | Noted: 2019-07-26

## 2019-12-17 PROBLEM — R51.9 CHRONIC HEAD PAIN: Status: ACTIVE | Noted: 2019-07-19

## 2019-12-17 PROBLEM — G89.29 CHRONIC HEAD PAIN: Status: ACTIVE | Noted: 2019-07-19

## 2019-12-17 PROBLEM — M65.30 TRIGGER FINGER OF RIGHT HAND: Status: ACTIVE | Noted: 2019-02-04

## 2019-12-17 PROBLEM — M10.062 ACUTE IDIOPATHIC GOUT OF LEFT KNEE: Status: ACTIVE | Noted: 2019-12-17

## 2019-12-17 PROCEDURE — 99214 OFFICE O/P EST MOD 30 MIN: CPT | Performed by: FAMILY MEDICINE

## 2019-12-17 RX ORDER — ACETAMINOPHEN AND CODEINE PHOSPHATE 300; 30 MG/1; MG/1
1 TABLET ORAL NIGHTLY PRN
Qty: 30 TABLET | Refills: 0 | Status: SHIPPED | OUTPATIENT
Start: 2019-12-17 | End: 2020-02-26 | Stop reason: SDUPTHER

## 2019-12-17 NOTE — PROGRESS NOTES
Ria Chan is a 64 y.o. female.     Chief Complaint   Patient presents with   • Gout     follow up pt doing well no complains        HPI     Pt is a pleasant 64 y.o. YO female here for Gout in both wrists.  Having chronic pain in the L knee and R knee improving now with cortisone injection with plan for hyaluronic injection.  Still with lower  Back pain.  She has had PT, exercising and still with chronic pain.  She has had a lot of help with PT. however despite this she is still in severe chronic pain most of the day.  She has been taking NSAIDs regularly, and having some slight worsening in renal function.  Tylenol does not help her pain.      The following portions of the patient's history were reviewed and updated as appropriate: allergies, current medications, past family history, past medical history, past social history, past surgical history and problem list.    Review of Systems   Constitutional: Negative.    Eyes: Negative.    Respiratory: Negative.    Cardiovascular: Negative.  Negative for chest pain, palpitations and leg swelling.   Gastrointestinal: Negative.    Endocrine: Negative.    Genitourinary: Negative.    Musculoskeletal: Positive for arthralgias, back pain, gait problem, joint swelling and myalgias.   Skin: Negative.    Hematological: Negative.    Psychiatric/Behavioral: Negative.        Objective  Vitals:    12/17/19 1113   BP: 132/84   Pulse: 78   Temp: 97.7 °F (36.5 °C)   SpO2: 98%        Physical Exam   Constitutional: She is oriented to person, place, and time. She appears well-developed and well-nourished. No distress.   HENT:   Head: Normocephalic.   Nose: Nose normal.   Eyes: EOM are normal.   Cardiovascular: Normal rate, regular rhythm, normal heart sounds and intact distal pulses.   No murmur heard.  Pulmonary/Chest: Effort normal and breath sounds normal. No respiratory distress.   Musculoskeletal: Normal range of motion.   Neurological: She is alert and oriented to person,  place, and time.   Skin: Skin is warm and dry. No rash noted.   Psychiatric: She has a normal mood and affect. Her behavior is normal. Judgment and thought content normal.   Nursing note and vitals reviewed.        Current Outpatient Medications:   •  allopurinol (ZYLOPRIM) 100 MG tablet, Take 1 tablet by mouth Daily., Disp: 90 tablet, Rfl: 3  •  amLODIPine (NORVASC) 10 MG tablet, TAKE 1 TABLET DAILY, Disp: 90 tablet, Rfl: 3  •  aspirin 81 MG tablet, Take 81 mg by mouth daily., Disp: , Rfl:   •  baclofen (LIORESAL) 10 MG tablet, Take 1 tablet by mouth 3 (Three) Times a Day As Needed for Muscle Spasms., Disp: 90 tablet, Rfl: 3  •  BD PEN NEEDLE SERVANDO U/F 32G X 4 MM misc, 1 daily for injection of Toujeo, Disp: 100 each, Rfl: 3  •  BYDUREON 2 MG pen-injector injection, 2 mg weekly., Disp: 12 pen, Rfl: 1  •  Calcium-Vitamin D-Vitamin K (VIACTIV PO), Take 1 tablet by mouth., Disp: , Rfl:   •  glucose blood (ONE TOUCH ULTRA TEST) test strip, Use to test BG 5 times daily, Disp: 500 each, Rfl: 0  •  irbesartan (AVAPRO) 75 MG tablet, TAKE 1 TABLET DAILY, Disp: 90 tablet, Rfl: 4  •  Multiple Vitamins-Minerals (MULTIVITAMIN ADULT PO), Take 1 tablet by mouth daily., Disp: , Rfl:   •  ONETOUCH DELICA LANCETS 33G misc, Use to test BG 5 times daily, Disp: 500 each, Rfl: 0  •  rosuvastatin (CRESTOR) 20 MG tablet, Take 1 tablet by mouth Daily., Disp: 90 tablet, Rfl: 3  •  TOUJEO SOLOSTAR 300 UNIT/ML solution pen-injector injection, Inject 225 Units under the skin into the appropriate area as directed Daily With Breakfast., Disp: 24 pen, Rfl: 3  •  triamterene-hydrochlorothiazide (MAXZIDE-25) 37.5-25 MG per tablet, TAKE 1 TABLET DAILY, Disp: 90 tablet, Rfl: 3  •  XIGDUO XR 5-1000 MG tablet, Take 2 tablets by mouth Daily., Disp: 180 tablet, Rfl: 3  •  acetaminophen-codeine (TYLENOL #3) 300-30 MG per tablet, Take 1 tablet by mouth At Night As Needed for Moderate Pain ., Disp: 30 tablet, Rfl: 0  •  HYDROcodone-acetaminophen (NORCO) 5-325  MG per tablet, Take 1 tablet by mouth Every 8 (Eight) Hours As Needed for Severe Pain ., Disp: 10 tablet, Rfl: 0    Procedures    Lab Results (most recent)     None              Ria was seen today for gout.    Diagnoses and all orders for this visit:    Idiopathic chronic gout of left knee without tophus  -     Uric acid  -     acetaminophen-codeine (TYLENOL #3) 300-30 MG per tablet; Take 1 tablet by mouth At Night As Needed for Moderate Pain .    Low back pain radiating to right lower extremity  -     ToxASSURE Select 13 (MW) - Urine, Clean Catch  -     acetaminophen-codeine (TYLENOL #3) 300-30 MG per tablet; Take 1 tablet by mouth At Night As Needed for Moderate Pain .      Pleasant 64-year-old female here for chronic gout with recent flare that is improving.  She restarted Allopurinol 2 weeks ago.  We will recheck her uric acid.      Her pain has significantly improved but she does have some uncontrolled chronic back and knee pain that submitted infrequently limits her daily life.  She is planning to move to [place] in Jan so establishing a relationship with a pain doctor at this time would not be very helpful - but may be recommended in the future.  We did discuss nonpharmacologic options such as exercise, heat and massage.  She does have improvement with physical therapy.      Okay to start Tylenol 3 for more severe episodes of pain but not to be taken daily.  The patient has read and signed the [facility] Controlled Substance Contract.  I will continue to see patient for regular follow up appointments.  They are well controlled on their medication.  KENIA has been reviewed by me and is updated every 3 months. The patient is aware of the potential for addiction and dependence.    Return in about 3 months (around 3/17/2020), or if symptoms worsen or fail to improve, for Recheck FU joint pain if not had FU in [place] .      Sharonda Rodgers MD

## 2019-12-18 LAB — URATE SERPL-MCNC: 5.4 MG/DL (ref 2.4–5.7)

## 2019-12-20 NOTE — PROGRESS NOTES
Please call patient back with results.  The uric acid has resulted as improved from prior, now 5.4 from 6.8.  Please continue allopurinol.     Thank you

## 2019-12-22 LAB — DRUGS UR: NORMAL

## 2019-12-24 ENCOUNTER — TELEPHONE (OUTPATIENT)
Dept: ORTHOPEDIC SURGERY | Facility: CLINIC | Age: 64
End: 2019-12-24

## 2019-12-27 ENCOUNTER — CONSULT (OUTPATIENT)
Dept: ORTHOPEDIC SURGERY | Facility: CLINIC | Age: 64
End: 2019-12-27

## 2019-12-27 VITALS — BODY MASS INDEX: 48.32 KG/M2 | WEIGHT: 290 LBS | TEMPERATURE: 98.2 F | HEIGHT: 65 IN

## 2019-12-27 DIAGNOSIS — M17.11 PRIMARY OSTEOARTHRITIS OF RIGHT KNEE: Primary | ICD-10-CM

## 2019-12-27 PROCEDURE — 99213 OFFICE O/P EST LOW 20 MIN: CPT | Performed by: ORTHOPAEDIC SURGERY

## 2019-12-27 PROCEDURE — 20610 DRAIN/INJ JOINT/BURSA W/O US: CPT | Performed by: ORTHOPAEDIC SURGERY

## 2019-12-27 NOTE — PROGRESS NOTES
Patient: Ria Chan  YOB: 1955 64 y.o. female  Medical Record Number: [id number]    Chief Complaints:   Chief Complaint   Patient presents with   • Right Knee - Pain   • Consult       History of Present Illness:Ria Chan is a 64 y.o. female who presents with complaints of right greater than left severe knee pain.  She describes a constant stabbing pain which is slowly progressed over the last few years.  It is worse with standing sitting or walking.  She has had multiple cortisone injections over the years and they have helped quite a bit her last was about over a month ago but she now has worsening knee pain.  She has a history of diabetes type 2 her last hemoglobin A1c was 8.0.    Allergies:   Allergies   Allergen Reactions   • Aliskiren Other (See Comments)     Causes patient to go into a coma   • Timolol Maleate Dizziness, Palpitations, Photosensitivity and Shortness Of Breath   • Ace Inhibitors Angioedema   • Diclofenac Angioedema   • Sulfamethoxazole-Trimethoprim Nausea Only       Medications:   Current Outpatient Medications   Medication Sig Dispense Refill   • acetaminophen-codeine (TYLENOL #3) 300-30 MG per tablet Take 1 tablet by mouth At Night As Needed for Moderate Pain . 30 tablet 0   • allopurinol (ZYLOPRIM) 100 MG tablet Take 1 tablet by mouth Daily. 90 tablet 3   • amLODIPine (NORVASC) 10 MG tablet TAKE 1 TABLET DAILY 90 tablet 3   • aspirin 81 MG tablet Take 81 mg by mouth daily.     • baclofen (LIORESAL) 10 MG tablet Take 1 tablet by mouth 3 (Three) Times a Day As Needed for Muscle Spasms. 90 tablet 3   • BD PEN NEEDLE SERVANDO U/F 32G X 4 MM misc 1 daily for injection of Toujeo 100 each 3   • BYDUREON 2 MG pen-injector injection 2 mg weekly. 12 pen 1   • Calcium-Vitamin D-Vitamin K (VIACTIV PO) Take 1 tablet by mouth.     • glucose blood (ONE TOUCH ULTRA TEST) test strip Use to test BG 5 times daily 500 each 0   • HYDROcodone-acetaminophen (NORCO) 5-325 MG per tablet Take 1  "tablet by mouth Every 8 (Eight) Hours As Needed for Severe Pain . 10 tablet 0   • irbesartan (AVAPRO) 75 MG tablet TAKE 1 TABLET DAILY 90 tablet 4   • Multiple Vitamins-Minerals (MULTIVITAMIN ADULT PO) Take 1 tablet by mouth daily.     • ONETOUCH DELICA LANCETS 33G misc Use to test BG 5 times daily 500 each 0   • rosuvastatin (CRESTOR) 20 MG tablet Take 1 tablet by mouth Daily. 90 tablet 3   • TOUJEO SOLOSTAR 300 UNIT/ML solution pen-injector injection Inject 225 Units under the skin into the appropriate area as directed Daily With Breakfast. 24 pen 3   • triamterene-hydrochlorothiazide (MAXZIDE-25) 37.5-25 MG per tablet TAKE 1 TABLET DAILY 90 tablet 3   • XIGDUO XR 5-1000 MG tablet Take 2 tablets by mouth Daily. 180 tablet 3     No current facility-administered medications for this visit.          The following portions of the patient's history were reviewed and updated as appropriate: allergies, current medications, past family history, past medical history, past social history, past surgical history and problem list.    Review of Systems:   A 14 point review of systems was performed. All systems negative except pertinent positives/negative listed in HPI above    Physical Exam:   Vitals:    12/27/19 1454   Temp: 98.2 °F (36.8 °C)   Weight: 132 kg (290 lb)   Height: 165.1 cm (65\")       General: A and O x 3, ASA, NAD    SCLERA:    Normal    DENTITION:   Normal  Knee:  bilateral    ALIGNMENT:     Varus  ,   Patella  tracks  midline    GAIT:    Antalgic    SKIN:    No abnormality    RANGE OF MOTION:   5  -  105   DEG    STRENGTH:   4  / 5    LIGAMENTS:    No varus / valgus instability.   Negative  Lachman.    MENISCUS:     Negative   Marquis       DISTAL PULSES:    Paplable    DISTAL SENSATION :   Intact    LYMPHATICS:     No   lymphadenopathy    OTHER:          - Positive   effusion      - Crepitance with ROM          Radiology:  Xrays 3views both knee (ap,lateral, sunrise) taken previously demonstrating advanced " varus osteoarthritis with bone on bone articulation, subchondral cysts, and periarticular osteophytes    Assessment/Plan: Advanced right greater than left end-stage knee osteoarthritis.  At this point she is not ready to proceed with surgery which would be the next major step.  She wants to try with other conservative measures.  I injected the knee with Monovisc.  We set a goal for 40 pounds weight loss which would put her at 250.  I recommended dietary measures to help with weight reduction.  I also gave her prescription to see a physical therapist to work on quad strengthening.  She is getting ready to move to [place] so she will follow-up down there but I be happy to see her here at any point in the future.  Body mass index is 48.26 kg/m².  Large Joint Arthrocentesis: R knee  Date/Time: 12/27/2019 3:14 PM  Consent given by: patient  Site marked: site marked  Timeout: Immediately prior to procedure a time out was called to verify the correct patient, procedure, equipment, support staff and site/side marked as required   Supporting Documentation  Indications: pain   Procedure Details  Location: knee - R knee  Needle size: 25 G  Approach: anteromedial  Medications administered: 88 mg Hyaluronan 88 MG/4ML; 2 mL lidocaine (cardiac)  Patient tolerance: patient tolerated the procedure well with no immediate complications            Lucio Stark MD  12/27/2019  Answers for HPI/ROS submitted by the patient on 12/20/2019   How long have you been having these symptoms?: 1-4 weeks ago

## 2019-12-31 LAB
25(OH)D3+25(OH)D2 SERPL-MCNC: 41.3 NG/ML (ref 30–100)
ALBUMIN SERPL-MCNC: 4.1 G/DL (ref 3.5–5.2)
ALBUMIN/GLOB SERPL: 1.5 G/DL
ALP SERPL-CCNC: 78 U/L (ref 39–117)
ALT SERPL-CCNC: 18 U/L (ref 1–33)
AST SERPL-CCNC: 16 U/L (ref 1–32)
BILIRUB SERPL-MCNC: 0.4 MG/DL (ref 0.2–1.2)
BUN SERPL-MCNC: 18 MG/DL (ref 8–23)
BUN/CREAT SERPL: 17.3 (ref 7–25)
C PEPTIDE SERPL-MCNC: 1.5 NG/ML (ref 1.1–4.4)
CALCIUM SERPL-MCNC: 9.4 MG/DL (ref 8.6–10.5)
CHLORIDE SERPL-SCNC: 101 MMOL/L (ref 98–107)
CHOLEST SERPL-MCNC: 138 MG/DL (ref 0–200)
CO2 SERPL-SCNC: 29.1 MMOL/L (ref 22–29)
CREAT SERPL-MCNC: 1.04 MG/DL (ref 0.57–1)
GLOBULIN SER CALC-MCNC: 2.8 GM/DL
GLUCOSE SERPL-MCNC: 116 MG/DL (ref 65–99)
HBA1C MFR BLD: 8.3 % (ref 4.8–5.6)
HDLC SERPL-MCNC: 60 MG/DL (ref 40–60)
INTERPRETATION: NORMAL
LDLC SERPL CALC-MCNC: 65 MG/DL (ref 0–100)
Lab: NORMAL
POTASSIUM SERPL-SCNC: 4.3 MMOL/L (ref 3.5–5.2)
PROT SERPL-MCNC: 6.9 G/DL (ref 6–8.5)
SODIUM SERPL-SCNC: 144 MMOL/L (ref 136–145)
T4 SERPL-MCNC: 5.87 MCG/DL (ref 4.5–11.7)
TRIGL SERPL-MCNC: 67 MG/DL (ref 0–150)
TSH SERPL DL<=0.005 MIU/L-ACNC: 2.88 UIU/ML (ref 0.27–4.2)
UNABLE TO VOID: NORMAL
URATE SERPL-MCNC: 6.3 MG/DL (ref 2.4–5.7)
VLDLC SERPL CALC-MCNC: 13.4 MG/DL

## 2020-01-07 DIAGNOSIS — M76.31 IT BAND SYNDROME, RIGHT: ICD-10-CM

## 2020-01-07 DIAGNOSIS — G89.29 CHRONIC RIGHT-SIDED LOW BACK PAIN WITH RIGHT-SIDED SCIATICA: ICD-10-CM

## 2020-01-07 DIAGNOSIS — M54.41 CHRONIC RIGHT-SIDED LOW BACK PAIN WITH RIGHT-SIDED SCIATICA: ICD-10-CM

## 2020-01-07 RX ORDER — BACLOFEN 10 MG/1
10 TABLET ORAL 3 TIMES DAILY PRN
Qty: 90 TABLET | Refills: 3 | Status: SHIPPED | OUTPATIENT
Start: 2020-01-07

## 2020-01-13 ENCOUNTER — OFFICE VISIT (OUTPATIENT)
Dept: ENDOCRINOLOGY | Age: 65
End: 2020-01-13

## 2020-01-13 VITALS
DIASTOLIC BLOOD PRESSURE: 80 MMHG | SYSTOLIC BLOOD PRESSURE: 124 MMHG | WEIGHT: 293 LBS | BODY MASS INDEX: 48.82 KG/M2 | HEIGHT: 65 IN

## 2020-01-13 DIAGNOSIS — E11.21 DIABETIC NEPHROPATHY ASSOCIATED WITH TYPE 2 DIABETES MELLITUS (HCC): ICD-10-CM

## 2020-01-13 DIAGNOSIS — L65.9 HAIR LOSS: ICD-10-CM

## 2020-01-13 DIAGNOSIS — E11.65 UNCONTROLLED TYPE 2 DIABETES MELLITUS WITH HYPERGLYCEMIA (HCC): Primary | ICD-10-CM

## 2020-01-13 DIAGNOSIS — E66.01 MORBID OBESITY (HCC): ICD-10-CM

## 2020-01-13 DIAGNOSIS — IMO0001 UNCONTROLLED TYPE 2 DIABETES MELLITUS WITHOUT COMPLICATION, WITH LONG-TERM CURRENT USE OF INSULIN: ICD-10-CM

## 2020-01-13 DIAGNOSIS — E78.5 DYSLIPIDEMIA: ICD-10-CM

## 2020-01-13 DIAGNOSIS — I10 BENIGN HYPERTENSION: ICD-10-CM

## 2020-01-13 PROCEDURE — 99214 OFFICE O/P EST MOD 30 MIN: CPT | Performed by: NURSE PRACTITIONER

## 2020-01-13 RX ORDER — INSULIN GLARGINE 300 U/ML
160 INJECTION, SOLUTION SUBCUTANEOUS
Qty: 48 ML | Refills: 0 | Status: SHIPPED | OUTPATIENT
Start: 2020-01-13 | End: 2020-01-28 | Stop reason: SDUPTHER

## 2020-01-13 NOTE — PROGRESS NOTES
"Subjective   Ria Chan is a 64 y.o. female is here today for follow-up.  Chief Complaint   Patient presents with   • Diabetes     recent labs, pt is testing BG once daily, pt brought meter   • Hyperlipidemia   • Hypertension     /80   Ht 165.1 cm (65\")   Wt 136 kg (299 lb 3.2 oz)   BMI 49.79 kg/m²   Current Outpatient Medications on File Prior to Visit   Medication Sig   • acetaminophen-codeine (TYLENOL #3) 300-30 MG per tablet Take 1 tablet by mouth At Night As Needed for Moderate Pain .   • allopurinol (ZYLOPRIM) 100 MG tablet Take 1 tablet by mouth Daily.   • amLODIPine (NORVASC) 10 MG tablet TAKE 1 TABLET DAILY   • aspirin 81 MG tablet Take 81 mg by mouth daily.   • baclofen (LIORESAL) 10 MG tablet Take 1 tablet by mouth 3 (Three) Times a Day As Needed for Muscle Spasms.   • BD PEN NEEDLE SERVANDO U/F 32G X 4 MM misc 1 daily for injection of Toujeo   • BYDUREON 2 MG pen-injector injection 2 mg weekly.   • glucose blood (ONE TOUCH ULTRA TEST) test strip Use to test BG 5 times daily   • HYDROcodone-acetaminophen (NORCO) 5-325 MG per tablet Take 1 tablet by mouth Every 8 (Eight) Hours As Needed for Severe Pain .   • irbesartan (AVAPRO) 75 MG tablet TAKE 1 TABLET DAILY   • Multiple Vitamins-Minerals (MULTIVITAMIN ADULT PO) Take 1 tablet by mouth daily.   • ONETOUCH DELICA LANCETS 33G misc Use to test BG 5 times daily   • rosuvastatin (CRESTOR) 20 MG tablet Take 1 tablet by mouth Daily.   • triamterene-hydrochlorothiazide (MAXZIDE-25) 37.5-25 MG per tablet TAKE 1 TABLET DAILY   • XIGDUO XR 5-1000 MG tablet Take 2 tablets by mouth Daily.   • [DISCONTINUED] TOUJEO SOLOSTAR 300 UNIT/ML solution pen-injector injection Inject 225 Units under the skin into the appropriate area as directed Daily With Breakfast.   • Calcium-Vitamin D-Vitamin K (VIACTIV PO) Take 1 tablet by mouth.     No current facility-administered medications on file prior to visit.      Family History   Problem Relation Age of Onset   • " Diabetes Father    • Hypertension Father    • Kidney disease Father    • Uterine cancer Mother         metastatic    • Uterine cancer Maternal Grandmother    • Osteoarthritis Sister    • Breast cancer Neg Hx    • Colon cancer Neg Hx    • Heart attack Neg Hx    • Stroke Neg Hx      Social History     Tobacco Use   • Smoking status: Never Smoker   • Smokeless tobacco: Never Used   Substance Use Topics   • Alcohol use: Yes     Alcohol/week: 1.0 standard drinks     Types: 1 Glasses of wine per week     Comment: socially /SELDOM   • Drug use: No     Allergies   Allergen Reactions   • Aliskiren Other (See Comments)     Causes patient to go into a coma   • Timolol Maleate Dizziness, Palpitations, Photosensitivity and Shortness Of Breath   • Ace Inhibitors Angioedema   • Diclofenac Angioedema   • Sulfamethoxazole-Trimethoprim Nausea Only         History of Present Illness   Encounter Diagnoses   Name Primary?   • Benign hypertension    • Diabetic nephropathy associated with type 2 diabetes mellitus (CMS/HCC)    • Dyslipidemia    • Hair loss    • Uncontrolled type 2 diabetes mellitus with hyperglycemia (CMS/HCC) Yes   • Morbid obesity (CMS/HCC)    • Uncontrolled type 2 diabetes mellitus without complication, with long-term current use of insulin (CMS/HCC)      64-year-old male patient here today for follow-up visit.  She has been seen for the above diagnoses.  She had recent labs which were reviewed and she was provided a copy.  Her medication list was reviewed and updated.  She is taking her medications as prescribed.  She denies any hypoglycemic events.  Her blood pressure is in satisfactory range.  She has gained weight according to our scales.  She has an increase with her A1c from her previous reading.  She explains that this is due to problems with her knees and lack of mobility.  She has chronic pain.  She is currently checking her blood sugars once daily in the mornings.  Her overall blood sugars in the morning are  in satisfactory range.  Because her A1c is gone up she is been advised to check her blood sugars later in the day.    The following portions of the patient's history were reviewed and updated as appropriate: allergies, current medications, past family history, past medical history, past social history, past surgical history and problem list.    Review of Systems   Constitutional: Positive for fatigue.   Cardiovascular: Negative for palpitations.   Gastrointestinal: Positive for constipation.   Endocrine: Positive for polydipsia.   Genitourinary: Negative for frequency.       Objective   Physical Exam   Constitutional: She is oriented to person, place, and time. She appears well-developed and well-nourished. No distress.   HENT:   Head: Normocephalic and atraumatic.   Right Ear: External ear normal.   Left Ear: External ear normal.   Nose: Nose normal.   Mouth/Throat: Oropharynx is clear and moist. No oropharyngeal exudate.   Eyes: Pupils are equal, round, and reactive to light. EOM are normal. Right eye exhibits no discharge. Left eye exhibits no discharge.   Neck: Trachea normal, normal range of motion and full passive range of motion without pain. Neck supple. No tracheal tenderness present. Carotid bruit is not present. No tracheal deviation, no edema and no erythema present. No thyroid mass and no thyromegaly present.   Cardiovascular: Normal rate, regular rhythm, normal heart sounds and intact distal pulses. Exam reveals no gallop and no friction rub.   No murmur heard.  Pulmonary/Chest: Effort normal and breath sounds normal. No stridor. No respiratory distress. She has no wheezes. She has no rales.   Abdominal: Soft. Bowel sounds are normal. She exhibits no distension.   Musculoskeletal: Normal range of motion. She exhibits no edema or deformity.   Lymphadenopathy:     She has no cervical adenopathy.   Neurological: She is alert and oriented to person, place, and time.   Skin: Skin is warm and dry. No rash  noted. She is not diaphoretic. No erythema. No pallor.   Acanthosis nigricans   Psychiatric: She has a normal mood and affect. Her behavior is normal. Judgment and thought content normal.   Nursing note and vitals reviewed.    Lab Results   Component Value Date    HGBA1C 8.30 (H) 12/30/2019     Lab Results   Component Value Date    GLUCOSE 142 (H) 01/15/2019    BUN 18 12/30/2019    CREATININE 1.04 (H) 12/30/2019    EGFRIFNONA 54 (L) 12/30/2019    EGFRIFAFRI 65 12/30/2019    BCR 17.3 12/30/2019    K 4.3 12/30/2019    CO2 29.1 (H) 12/30/2019    CALCIUM 9.4 12/30/2019    PROTENTOTREF 6.9 12/30/2019    ALBUMIN 4.10 12/30/2019    LABIL2 1.5 12/30/2019    AST 16 12/30/2019    ALT 18 12/30/2019     Lab Results   Component Value Date    CHLPL 138 12/30/2019    TRIG 67 12/30/2019    HDL 60 12/30/2019    LDL 65 12/30/2019     Lab Results   Component Value Date    TSH 2.880 12/30/2019    R6TKKFT 5.87 12/30/2019         Assessment/Plan   Problems Addressed this Visit        Cardiovascular and Mediastinum    Benign hypertension       Digestive    Morbid obesity (CMS/HCC)       Endocrine    Diabetic nephropathy associated with type 2 diabetes mellitus (CMS/HCC)    Relevant Medications    TOUJEO SOLOSTAR 300 UNIT/ML solution pen-injector injection    Uncontrolled type 2 diabetes mellitus with hyperglycemia (CMS/HCC) - Primary    Relevant Medications    TOUJEO SOLOSTAR 300 UNIT/ML solution pen-injector injection       Musculoskeletal and Integument    Hair loss       Other    Dyslipidemia      Other Visit Diagnoses     Uncontrolled type 2 diabetes mellitus without complication, with long-term current use of insulin (CMS/[facility])        Relevant Medications    TOUJEO SOLOSTAR 300 UNIT/ML solution pen-injector injection          Patient was seen and examined.  Metabolically and clinically she is stable.  Her A1c is above target.  She is get ready to move out of [facility] to [place] and will establish with a new provider.  She has been  advised to check her blood sugars pre-and postprandial so that we can target any issues she is having until she establishes with a new endocrinologist.  Her blood pressures in satisfactory range.  Cholesterol is controlled and vitamin D is stable.  She can call our office should she have any concerns prior to establishing with a new provider.

## 2020-01-15 DIAGNOSIS — E66.01 MORBID OBESITY DUE TO EXCESS CALORIES (HCC): ICD-10-CM

## 2020-01-15 DIAGNOSIS — I10 ESSENTIAL HYPERTENSION: ICD-10-CM

## 2020-01-15 RX ORDER — AMLODIPINE BESYLATE 10 MG/1
10 TABLET ORAL DAILY
Qty: 90 TABLET | Refills: 1 | Status: SHIPPED | OUTPATIENT
Start: 2020-01-15 | End: 2020-01-24 | Stop reason: SDUPTHER

## 2020-01-15 RX ORDER — IRBESARTAN 75 MG/1
75 TABLET ORAL DAILY
Qty: 90 TABLET | Refills: 1 | Status: SHIPPED | OUTPATIENT
Start: 2020-01-15 | End: 2020-01-24 | Stop reason: SDUPTHER

## 2020-01-15 RX ORDER — ROSUVASTATIN CALCIUM 20 MG/1
20 TABLET, COATED ORAL DAILY
Qty: 90 TABLET | Refills: 1 | Status: SHIPPED | OUTPATIENT
Start: 2020-01-15 | End: 2020-01-24 | Stop reason: SDUPTHER

## 2020-01-15 RX ORDER — DAPAGLIFLOZIN AND METFORMIN HYDROCHLORIDE 5; 1000 MG/1; MG/1
2 TABLET, FILM COATED, EXTENDED RELEASE ORAL DAILY
Qty: 180 TABLET | Refills: 1 | Status: SHIPPED | OUTPATIENT
Start: 2020-01-15 | End: 2020-01-24 | Stop reason: SDUPTHER

## 2020-01-15 RX ORDER — ALLOPURINOL 100 MG/1
100 TABLET ORAL DAILY
Qty: 90 TABLET | Refills: 1 | Status: SHIPPED | OUTPATIENT
Start: 2020-01-15 | End: 2020-01-24 | Stop reason: SDUPTHER

## 2020-01-24 ENCOUNTER — TELEPHONE (OUTPATIENT)
Dept: ENDOCRINOLOGY | Age: 65
End: 2020-01-24

## 2020-01-24 DIAGNOSIS — I10 ESSENTIAL HYPERTENSION: ICD-10-CM

## 2020-01-24 DIAGNOSIS — E66.01 MORBID OBESITY DUE TO EXCESS CALORIES (HCC): ICD-10-CM

## 2020-01-24 RX ORDER — ROSUVASTATIN CALCIUM 20 MG/1
20 TABLET, COATED ORAL DAILY
Qty: 90 TABLET | Refills: 1 | Status: SHIPPED | OUTPATIENT
Start: 2020-01-24 | End: 2020-01-28 | Stop reason: SDUPTHER

## 2020-01-24 RX ORDER — IRBESARTAN 75 MG/1
75 TABLET ORAL DAILY
Qty: 90 TABLET | Refills: 1 | Status: SHIPPED | OUTPATIENT
Start: 2020-01-24 | End: 2020-01-28 | Stop reason: SDUPTHER

## 2020-01-24 RX ORDER — ALLOPURINOL 100 MG/1
100 TABLET ORAL DAILY
Qty: 90 TABLET | Refills: 1 | Status: SHIPPED | OUTPATIENT
Start: 2020-01-24 | End: 2020-01-28 | Stop reason: SDUPTHER

## 2020-01-24 RX ORDER — DAPAGLIFLOZIN AND METFORMIN HYDROCHLORIDE 5; 1000 MG/1; MG/1
2 TABLET, FILM COATED, EXTENDED RELEASE ORAL DAILY
Qty: 180 TABLET | Refills: 1 | Status: SHIPPED | OUTPATIENT
Start: 2020-01-24 | End: 2020-01-28 | Stop reason: SDUPTHER

## 2020-01-24 RX ORDER — AMLODIPINE BESYLATE 10 MG/1
10 TABLET ORAL DAILY
Qty: 90 TABLET | Refills: 1 | Status: SHIPPED | OUTPATIENT
Start: 2020-01-24 | End: 2020-01-28 | Stop reason: SDUPTHER

## 2020-01-24 NOTE — TELEPHONE ENCOUNTER
Patient called and states she has moved and her prescriptions were sent to [facility]   [facility]       [facility] are [facility] ones kyra just renewed     Patient phone number [phone]

## 2020-01-28 DIAGNOSIS — IMO0001 UNCONTROLLED TYPE 2 DIABETES MELLITUS WITHOUT COMPLICATION, WITH LONG-TERM CURRENT USE OF INSULIN: ICD-10-CM

## 2020-01-28 DIAGNOSIS — IMO0002 UNCONTROLLED TYPE 2 DIABETES MELLITUS: ICD-10-CM

## 2020-01-28 DIAGNOSIS — I10 ESSENTIAL HYPERTENSION: ICD-10-CM

## 2020-01-28 DIAGNOSIS — E66.01 MORBID OBESITY DUE TO EXCESS CALORIES (HCC): ICD-10-CM

## 2020-01-28 RX ORDER — ALLOPURINOL 100 MG/1
100 TABLET ORAL DAILY
Qty: 90 TABLET | Refills: 1 | Status: SHIPPED | OUTPATIENT
Start: 2020-01-28

## 2020-01-28 RX ORDER — ROSUVASTATIN CALCIUM 20 MG/1
20 TABLET, COATED ORAL DAILY
Qty: 90 TABLET | Refills: 1 | Status: SHIPPED | OUTPATIENT
Start: 2020-01-28

## 2020-01-28 RX ORDER — EXENATIDE 2 MG/.65ML
INJECTION, SUSPENSION, EXTENDED RELEASE SUBCUTANEOUS
Qty: 12 PEN | Refills: 1 | Status: SHIPPED | OUTPATIENT
Start: 2020-01-28

## 2020-01-28 RX ORDER — PEN NEEDLE, DIABETIC 32GX 5/32"
NEEDLE, DISPOSABLE MISCELLANEOUS
Qty: 100 EACH | Refills: 1 | Status: SHIPPED | OUTPATIENT
Start: 2020-01-28

## 2020-01-28 RX ORDER — DAPAGLIFLOZIN AND METFORMIN HYDROCHLORIDE 5; 1000 MG/1; MG/1
2 TABLET, FILM COATED, EXTENDED RELEASE ORAL DAILY
Qty: 180 TABLET | Refills: 1 | Status: SHIPPED | OUTPATIENT
Start: 2020-01-28

## 2020-01-28 RX ORDER — IRBESARTAN 75 MG/1
75 TABLET ORAL DAILY
Qty: 90 TABLET | Refills: 1 | Status: SHIPPED | OUTPATIENT
Start: 2020-01-28

## 2020-01-28 RX ORDER — INSULIN GLARGINE 300 U/ML
160 INJECTION, SOLUTION SUBCUTANEOUS
Qty: 48 ML | Refills: 1 | Status: SHIPPED | OUTPATIENT
Start: 2020-01-28 | End: 2020-04-27

## 2020-01-28 RX ORDER — AMLODIPINE BESYLATE 10 MG/1
10 TABLET ORAL DAILY
Qty: 90 TABLET | Refills: 1 | Status: SHIPPED | OUTPATIENT
Start: 2020-01-28

## 2020-01-28 RX ORDER — BLOOD-GLUCOSE CONTROL, LOW
EACH MISCELLANEOUS
Qty: 200 EACH | Refills: 1 | Status: SHIPPED | OUTPATIENT
Start: 2020-01-28

## 2020-02-11 DIAGNOSIS — M54.50 LOW BACK PAIN RADIATING TO RIGHT LOWER EXTREMITY: ICD-10-CM

## 2020-02-11 DIAGNOSIS — M1A.0620 IDIOPATHIC CHRONIC GOUT OF LEFT KNEE WITHOUT TOPHUS: ICD-10-CM

## 2020-02-11 DIAGNOSIS — M79.604 LOW BACK PAIN RADIATING TO RIGHT LOWER EXTREMITY: ICD-10-CM

## 2020-02-26 DIAGNOSIS — M1A.0620 IDIOPATHIC CHRONIC GOUT OF LEFT KNEE WITHOUT TOPHUS: ICD-10-CM

## 2020-02-26 DIAGNOSIS — M79.604 LOW BACK PAIN RADIATING TO RIGHT LOWER EXTREMITY: ICD-10-CM

## 2020-02-26 DIAGNOSIS — M54.50 LOW BACK PAIN RADIATING TO RIGHT LOWER EXTREMITY: ICD-10-CM

## 2020-02-26 RX ORDER — ACETAMINOPHEN AND CODEINE PHOSPHATE 300; 30 MG/1; MG/1
1 TABLET ORAL NIGHTLY PRN
Qty: 30 TABLET | Refills: 0 | Status: SHIPPED | OUTPATIENT
Start: 2020-02-26

## 2020-06-04 DIAGNOSIS — R60.9 EDEMA, UNSPECIFIED TYPE: ICD-10-CM

## 2020-06-04 DIAGNOSIS — I10 BENIGN HYPERTENSION: ICD-10-CM

## 2020-06-04 RX ORDER — TRIAMTERENE AND HYDROCHLOROTHIAZIDE 37.5; 25 MG/1; MG/1
TABLET ORAL
Qty: 90 TABLET | Refills: 3 | Status: SHIPPED | OUTPATIENT
Start: 2020-06-04

## 2021-03-19 ENCOUNTER — BULK ORDERING (OUTPATIENT)
Dept: CASE MANAGEMENT | Facility: OTHER | Age: 66
End: 2021-03-19

## 2021-03-19 DIAGNOSIS — Z23 IMMUNIZATION DUE: ICD-10-CM

## 2025-04-08 NOTE — PROGRESS NOTES
"Subjective   Ria Chan is a 61 y.o. female.     History of Present Illness Here to discuss BP. Only checks BP when feels weird. BP not out of control. Yest 128/70. Did just have eyes dilated today and thinks maybe that is why her BP was elevated today. Dr Purvis. Having problems with vision. Cataracts are not ready to be reomved, and no increased pressure or diabetic retinopathy. Dr Purvis the problem is due to high sugars.    Saw Raymundo Jackson yesterday and he did many labs. He had stopped the metformin and BS jumped up. Continues to work on BS issues.    Works at the [employer] [place].  Exercise: \"when I can\" on the weekend. Gym treadmill , bike and weights.  The following portions of the patient's history were reviewed and updated as appropriate: allergies, current medications, past social history and problem list.    Review of Systems   Constitutional: Negative for activity change, appetite change and unexpected weight change.   HENT: Negative for nosebleeds and trouble swallowing.    Eyes: Positive for visual disturbance (hpi). Negative for pain.   Respiratory: Negative for chest tightness, shortness of breath and wheezing.    Cardiovascular: Negative for chest pain and palpitations.   Gastrointestinal: Negative for abdominal pain and blood in stool.   Endocrine: Negative.    Genitourinary: Negative for difficulty urinating and hematuria.   Musculoskeletal: Negative for joint swelling.   Skin: Negative for color change and rash.   Allergic/Immunologic: Negative.    Neurological: Negative for syncope and speech difficulty.   Hematological: Negative for adenopathy.   Psychiatric/Behavioral: Negative for agitation and confusion.   All other systems reviewed and are negative.      Objective   Visit Vitals   • /80   • Pulse 79   • Resp 17   • Ht 65\" (165.1 cm)   • Wt (!) 302 lb (137 kg)   • SpO2 98%   • BMI 50.26 kg/m2     Physical Exam    Assessment/Plan   Problem List Items Addressed This Visit        " Digestive    Morbid obesity    Relevant Medications    rosuvastatin (CRESTOR) 20 MG tablet    amLODIPine (NORVASC) 10 MG tablet       Endocrine    Uncontrolled type 2 diabetes mellitus    Relevant Medications    rosuvastatin (CRESTOR) 20 MG tablet      Other Visit Diagnoses     Essential hypertension    -  Primary    Relevant Medications    amLODIPine (NORVASC) 10 MG tablet    furosemide (LASIX) 40 MG tablet    Drug therapy        Relevant Orders    CBC & Differential (Completed)    Health care maintenance        Relevant Orders    Hepatitis C Antibody (Completed)           Start regular exercise.   107.7

## (undated) DEVICE — KT MANIFLD CARDIAC

## (undated) DEVICE — GW EMR FIX EXCHG J STD .035 3MM 260CM

## (undated) DEVICE — CATH VENT MIV RADL PIG ST TIP 5F 110CM

## (undated) DEVICE — CATH DIAG IMPULSE FR4 5F 100CM

## (undated) DEVICE — CATH DIAG IMPULSE FL3.5 5F 100CM

## (undated) DEVICE — PK CATH CARD 40

## (undated) DEVICE — GLIDESHEATH SLENDER STAINLESS STEEL KIT: Brand: GLIDESHEATH SLENDER